# Patient Record
Sex: MALE | Race: WHITE | NOT HISPANIC OR LATINO | Employment: FULL TIME | URBAN - NONMETROPOLITAN AREA
[De-identification: names, ages, dates, MRNs, and addresses within clinical notes are randomized per-mention and may not be internally consistent; named-entity substitution may affect disease eponyms.]

---

## 2021-01-08 ENCOUNTER — HOSPITAL ENCOUNTER (EMERGENCY)
Facility: HOSPITAL | Age: 46
Discharge: HOME | End: 2021-01-08
Attending: EMERGENCY MEDICINE | Admitting: EMERGENCY MEDICINE
Payer: COMMERCIAL

## 2021-01-08 ENCOUNTER — APPOINTMENT (EMERGENCY)
Dept: RADIOLOGY | Facility: HOSPITAL | Age: 46
End: 2021-01-08
Payer: COMMERCIAL

## 2021-01-08 VITALS
DIASTOLIC BLOOD PRESSURE: 62 MMHG | OXYGEN SATURATION: 97 % | RESPIRATION RATE: 16 BRPM | TEMPERATURE: 96.9 F | HEART RATE: 79 BPM | WEIGHT: 165 LBS | SYSTOLIC BLOOD PRESSURE: 125 MMHG | BODY MASS INDEX: 26.63 KG/M2

## 2021-01-08 DIAGNOSIS — R07.9 CHEST PAIN: Primary | ICD-10-CM

## 2021-01-08 LAB
ALBUMIN SERPL BCP-MCNC: 4.1 G/DL (ref 3.5–5)
ALP SERPL-CCNC: 104 U/L (ref 46–116)
ALT SERPL W P-5'-P-CCNC: 44 U/L (ref 12–78)
ANION GAP SERPL CALCULATED.3IONS-SCNC: 8 MMOL/L (ref 4–13)
AST SERPL W P-5'-P-CCNC: 19 U/L (ref 5–45)
BASOPHILS # BLD AUTO: 0.02 THOUSANDS/ΜL (ref 0–0.1)
BASOPHILS NFR BLD AUTO: 0 % (ref 0–1)
BILIRUB SERPL-MCNC: 0.46 MG/DL (ref 0.2–1)
BUN SERPL-MCNC: 16 MG/DL (ref 5–25)
CALCIUM SERPL-MCNC: 8.9 MG/DL (ref 8.3–10.1)
CHLORIDE SERPL-SCNC: 107 MMOL/L (ref 100–108)
CO2 SERPL-SCNC: 30 MMOL/L (ref 21–32)
CREAT SERPL-MCNC: 0.97 MG/DL (ref 0.6–1.3)
EOSINOPHIL # BLD AUTO: 0.1 THOUSAND/ΜL (ref 0–0.61)
EOSINOPHIL NFR BLD AUTO: 1 % (ref 0–6)
ERYTHROCYTE [DISTWIDTH] IN BLOOD BY AUTOMATED COUNT: 12.7 % (ref 11.6–15.1)
GFR SERPL CREATININE-BSD FRML MDRD: 94 ML/MIN/1.73SQ M
GLUCOSE SERPL-MCNC: 92 MG/DL (ref 65–140)
HCT VFR BLD AUTO: 43.5 % (ref 36.5–49.3)
HGB BLD-MCNC: 14.6 G/DL (ref 12–17)
IMM GRANULOCYTES # BLD AUTO: 0.01 THOUSAND/UL (ref 0–0.2)
IMM GRANULOCYTES NFR BLD AUTO: 0 % (ref 0–2)
LYMPHOCYTES # BLD AUTO: 1.85 THOUSANDS/ΜL (ref 0.6–4.47)
LYMPHOCYTES NFR BLD AUTO: 27 % (ref 14–44)
MCH RBC QN AUTO: 27.2 PG (ref 26.8–34.3)
MCHC RBC AUTO-ENTMCNC: 33.6 G/DL (ref 31.4–37.4)
MCV RBC AUTO: 81 FL (ref 82–98)
MONOCYTES # BLD AUTO: 0.57 THOUSAND/ΜL (ref 0.17–1.22)
MONOCYTES NFR BLD AUTO: 8 % (ref 4–12)
NEUTROPHILS # BLD AUTO: 4.44 THOUSANDS/ΜL (ref 1.85–7.62)
NEUTS SEG NFR BLD AUTO: 64 % (ref 43–75)
NRBC BLD AUTO-RTO: 0 /100 WBCS
PLATELET # BLD AUTO: 244 THOUSANDS/UL (ref 149–390)
PMV BLD AUTO: 10.2 FL (ref 8.9–12.7)
POTASSIUM SERPL-SCNC: 3.8 MMOL/L (ref 3.5–5.3)
PROT SERPL-MCNC: 7.3 G/DL (ref 6.4–8.2)
RBC # BLD AUTO: 5.36 MILLION/UL (ref 3.88–5.62)
SODIUM SERPL-SCNC: 145 MMOL/L (ref 136–145)
TROPONIN I SERPL-MCNC: <0.02 NG/ML
WBC # BLD AUTO: 6.99 THOUSAND/UL (ref 4.31–10.16)

## 2021-01-08 PROCEDURE — 71045 X-RAY EXAM CHEST 1 VIEW: CPT

## 2021-01-08 PROCEDURE — 99285 EMERGENCY DEPT VISIT HI MDM: CPT | Performed by: EMERGENCY MEDICINE

## 2021-01-08 PROCEDURE — 84484 ASSAY OF TROPONIN QUANT: CPT | Performed by: PHYSICIAN ASSISTANT

## 2021-01-08 PROCEDURE — 85025 COMPLETE CBC W/AUTO DIFF WBC: CPT | Performed by: PHYSICIAN ASSISTANT

## 2021-01-08 PROCEDURE — 99285 EMERGENCY DEPT VISIT HI MDM: CPT

## 2021-01-08 PROCEDURE — 93005 ELECTROCARDIOGRAM TRACING: CPT

## 2021-01-08 PROCEDURE — 36415 COLL VENOUS BLD VENIPUNCTURE: CPT | Performed by: PHYSICIAN ASSISTANT

## 2021-01-08 PROCEDURE — 96374 THER/PROPH/DIAG INJ IV PUSH: CPT

## 2021-01-08 PROCEDURE — 80053 COMPREHEN METABOLIC PANEL: CPT | Performed by: PHYSICIAN ASSISTANT

## 2021-01-08 PROCEDURE — 96375 TX/PRO/DX INJ NEW DRUG ADDON: CPT

## 2021-01-08 RX ORDER — DIAZEPAM 5 MG/ML
2.5 INJECTION, SOLUTION INTRAMUSCULAR; INTRAVENOUS ONCE
Status: COMPLETED | OUTPATIENT
Start: 2021-01-08 | End: 2021-01-08

## 2021-01-08 RX ORDER — KETOROLAC TROMETHAMINE 30 MG/ML
30 INJECTION, SOLUTION INTRAMUSCULAR; INTRAVENOUS ONCE
Status: COMPLETED | OUTPATIENT
Start: 2021-01-08 | End: 2021-01-08

## 2021-01-08 RX ADMIN — DIAZEPAM 2.5 MG: 10 INJECTION, SOLUTION INTRAMUSCULAR; INTRAVENOUS at 20:43

## 2021-01-08 RX ADMIN — KETOROLAC TROMETHAMINE 30 MG: 30 INJECTION, SOLUTION INTRAMUSCULAR at 19:27

## 2021-01-09 NOTE — ED PROVIDER NOTES
History  Chief Complaint   Patient presents with    Chest Pain     pt states chest pain intermittent since roman  has had hx of same in the past  intermittent N, denies SOB     51-year-old male with past medical history chest pain, hyperlipidemia presents to ED with 2 weeks intermittent chest pain  Patient has had previous episodes in the past and is being worked up by Cardiology  Patient had a negative stress test at [facility] for previous symptoms  Chest Pain  Pain location:  Substernal area  Pain quality: aching    Pain radiates to:  Does not radiate  Pain radiates to the back: no    Pain severity:  Mild  Onset quality:  Sudden  Timing:  Intermittent  Progression:  Unchanged  Chronicity:  Recurrent  Context: not breathing, not at rest and no trauma    Relieved by:  None tried  Worsened by:  Nothing tried  Ineffective treatments:  None tried  Associated symptoms: no abdominal pain, no anxiety, no back pain, no cough, no fever, no orthopnea, no shortness of breath, not vomiting and no weakness    Risk factors: high cholesterol        Prior to Admission Medications   Prescriptions Last Dose Informant Patient Reported?  Taking?   atorvastatin (LIPITOR) 10 mg tablet  Self Yes No   Sig: Take 10 mg by mouth daily   fluticasone (FLONASE) 50 mcg/act nasal spray   No No   Sig: ADMINISTER 2 SPRAYS INTO NOSTRIL DAILY   omeprazole (PriLOSEC) 40 MG capsule  Self Yes No   Sig: Take 40 mg by mouth daily   ranitidine (ZANTAC) 300 MG capsule  Self Yes No   Sig: Take 300 mg by mouth every evening      Facility-Administered Medications: None       Past Medical History:   Diagnosis Date    Chronic sinusitis     Chronic throat clearing     DNS (deviated nasal septum)     Dysphagia     ETD (eustachian tube dysfunction)     GERD (gastroesophageal reflux disease)     Globus sensation     HL (hearing loss)     Hyperlipemia     Neck pain on left side     PND (post-nasal drip)     Rhinitis     Sleep difficulties     snoring       Past Surgical History:   Procedure Laterality Date    APPENDECTOMY         History reviewed  No pertinent family history  I have reviewed and agree with the history as documented  E-Cigarette/Vaping    E-Cigarette Use Never User      E-Cigarette/Vaping Substances     Social History     Tobacco Use    Smoking status: Former Smoker     Types: Cigarettes    Smokeless tobacco: Never Used   Substance Use Topics    Alcohol use: Not Currently    Drug use: Never       Review of Systems   Constitutional: Negative for fever  Respiratory: Negative for cough and shortness of breath  Cardiovascular: Positive for chest pain  Negative for orthopnea  Gastrointestinal: Negative for abdominal pain and vomiting  Musculoskeletal: Negative for back pain  Neurological: Negative for weakness  All other systems reviewed and are negative  Physical Exam  Physical Exam  Vitals signs and nursing note reviewed  Constitutional:       General: He is not in acute distress  Appearance: He is well-developed  HENT:      Head: Normocephalic and atraumatic  Right Ear: External ear normal       Left Ear: External ear normal    Eyes:      Conjunctiva/sclera: Conjunctivae normal       Pupils: Pupils are equal, round, and reactive to light  Neck:      Musculoskeletal: Normal range of motion and neck supple  Cardiovascular:      Rate and Rhythm: Normal rate and regular rhythm  Heart sounds: Normal heart sounds  No murmur  Pulmonary:      Effort: Pulmonary effort is normal       Breath sounds: Normal breath sounds  No wheezing or rales  Abdominal:      General: Bowel sounds are normal  There is no distension  Palpations: Abdomen is soft  Tenderness: There is no abdominal tenderness  There is no guarding or rebound  Musculoskeletal: Normal range of motion  General: No deformity  Skin:     General: Skin is warm and dry  Findings: No rash  Neurological:      General: No focal deficit present  Mental Status: He is alert and oriented to person, place, and time  Cranial Nerves: No cranial nerve deficit     Psychiatric:         Behavior: Behavior normal          Vital Signs  ED Triage Vitals [01/08/21 1757]   Temperature Pulse Respirations Blood Pressure SpO2   98 1 °F (36 7 °C) 96 20 143/91 98 %      Temp Source Heart Rate Source Patient Position - Orthostatic VS BP Location FiO2 (%)   Temporal Monitor Sitting Right arm --      Pain Score       5           Vitals:    01/08/21 1757 01/08/21 2005 01/08/21 2100   BP: 143/91 127/59 125/62   Pulse: 96 73 79   Patient Position - Orthostatic VS: Sitting           Visual Acuity      ED Medications  Medications   ketorolac (TORADOL) injection 30 mg (30 mg Intravenous Given 1/8/21 1927)   diazepam (VALIUM) injection 2 5 mg (2 5 mg Intravenous Given 1/8/21 2043)       Diagnostic Studies  Results Reviewed     Procedure Component Value Units Date/Time    Troponin I [[id number]]  (Normal) Collected: 01/08/21 1848    Lab Status: Final result Specimen: Blood from Arm, Left Updated: 01/08/21 1914     Troponin I <0 02 ng/mL     Comprehensive metabolic panel [[id number]] Collected: 01/08/21 1848    Lab Status: Final result Specimen: Blood from Arm, Left Updated: 01/08/21 1910     Sodium 145 mmol/L      Potassium 3 8 mmol/L      Chloride 107 mmol/L      CO2 30 mmol/L      ANION GAP 8 mmol/L      BUN 16 mg/dL      Creatinine 0 97 mg/dL      Glucose 92 mg/dL      Calcium 8 9 mg/dL      AST 19 U/L      ALT 44 U/L      Alkaline Phosphatase 104 U/L      Total Protein 7 3 g/dL      Albumin 4 1 g/dL      Total Bilirubin 0 46 mg/dL      eGFR 94 ml/min/1 73sq m     Narrative:      Ralph guidelines for Chronic Kidney Disease (CKD):     Stage 1 with normal or high GFR (GFR > 90 mL/min/1 73 square meters)    Stage 2 Mild CKD (GFR = 60-89 mL/min/1 73 square meters)    Stage 3A Moderate CKD (GFR = 45-59 mL/min/1 73 square meters)    Stage 3B Moderate CKD (GFR = 30-44 mL/min/1 73 square meters)    Stage 4 Severe CKD (GFR = 15-29 mL/min/1 73 square meters)    Stage 5 End Stage CKD (GFR <15 mL/min/1 73 square meters)  Note: GFR calculation is accurate only with a steady state creatinine    CBC and differential [[id number]]  (Abnormal) Collected: 01/08/21 1848    Lab Status: Final result Specimen: Blood from Arm, Left Updated: 01/08/21 1853     WBC 6 99 Thousand/uL      RBC 5 36 Million/uL      Hemoglobin 14 6 g/dL      Hematocrit 43 5 %      MCV 81 fL      MCH 27 2 pg      MCHC 33 6 g/dL      RDW 12 7 %      MPV 10 2 fL      Platelets 382 Thousands/uL      nRBC 0 /100 WBCs      Neutrophils Relative 64 %      Immat GRANS % 0 %      Lymphocytes Relative 27 %      Monocytes Relative 8 %      Eosinophils Relative 1 %      Basophils Relative 0 %      Neutrophils Absolute 4 44 Thousands/µL      Immature Grans Absolute 0 01 Thousand/uL      Lymphocytes Absolute 1 85 Thousands/µL      Monocytes Absolute 0 57 Thousand/µL      Eosinophils Absolute 0 10 Thousand/µL      Basophils Absolute 0 02 Thousands/µL                  XR chest 1 view   ED Interpretation by Ghulam Wiley DO (01/08 2016)   No pneumonia      Final Result by Karla Srinivasan MD (01/09 0123)      No acute cardiopulmonary disease  Workstation performed: [id number]                    Procedures  Procedures         ED Course  ED Course as of Keenan 10 1932   Siminkeiko Rocha Jan 08, 2021 1904 Echo/stress test from 2014 reviewed  1906 EKG:  Normal sinus rhythm at 100 beats per minute, normal NE, normal QRS, normal QTC, no acute ST elevation noted on EKG  2101 Patient feeling better  Patient is afebrile, vitals stable, nontoxic appearing  counseled patient extensively signs & symptoms to return for and follow-up family doctor    Patient has a follow-up appointment with his cardiologist on the 19th                HEART Risk Score      Most Recent Value   Heart Score Risk Calculator   History  0 Filed at: 01/08/2021 1959   ECG  0 Filed at: 01/08/2021 1959   Age  0 Filed at: 01/08/2021 1959   Risk Factors  1 Filed at: 01/08/2021 1959   Troponin  0 Filed at: 01/08/2021 1959   HEART Score  1 Filed at: 01/08/2021 1959                      SBIRT 20yo+      Most Recent Value   SBIRT (25 yo +)   In order to provide better care to our patients, we are screening all of our patients for alcohol and drug use  Would it be okay to ask you these screening questions? Yes Filed at: 01/08/2021 1851   Initial Alcohol Screen: US AUDIT-C    1  How often do you have a drink containing alcohol?  0 Filed at: 01/08/2021 1851   2  How many drinks containing alcohol do you have on a typical day you are drinking? 0 Filed at: 01/08/2021 1851   3a  Male UNDER 65: How often do you have five or more drinks on one occasion? 0 Filed at: 01/08/2021 1851   3b  FEMALE Any Age, or MALE 65+: How often do you have 4 or more drinks on one occassion? 0 Filed at: 01/08/2021 1851   Audit-C Score  0 Filed at: 01/08/2021 1851   SUNNI: How many times in the past year have you    Used an illegal drug or used a prescription medication for non-medical reasons? Never Filed at: 01/08/2021 1851                    MDM    Disposition  Final diagnoses:   Chest pain     Time reflects when diagnosis was documented in both MDM as applicable and the Disposition within this note     Time User Action Codes Description Comment    1/8/2021  7:01 PM Viral Jarrell Add [R07 9] Chest pain       ED Disposition     ED Disposition Condition Date/Time Comment    Discharge Stable Fri Jan 8, 2021  9:04 PM Francesco Marcano discharge to home/self care              Follow-up Information     Follow up With Specialties Details Why DO [facility] Call in 1 day  [address] [facility],[address] [place] [address]      Pat Alcala MD Family Medicine Call in 1 day  [facility] [address] [place] [address]  [phone]            Discharge Medication List as of 1/8/2021  9:05 PM      CONTINUE these medications which have NOT CHANGED    Details   atorvastatin (LIPITOR) 10 mg tablet Take 10 mg by mouth daily, Historical Med      fluticasone (FLONASE) 50 mcg/act nasal spray ADMINISTER 2 SPRAYS INTO NOSTRIL DAILY, Normal      omeprazole (PriLOSEC) 40 MG capsule Take 40 mg by mouth daily, Historical Med      ranitidine (ZANTAC) 300 MG capsule Take 300 mg by mouth every evening, Historical Med           No discharge procedures on file      PDMP Review     None          ED Provider  Electronically Signed by           Demond Parsons DO  01/10/21 1932

## 2021-01-11 LAB
ATRIAL RATE: 100 BPM
P AXIS: 52 DEGREES
PR INTERVAL: 116 MS
QRS AXIS: 32 DEGREES
QRSD INTERVAL: 70 MS
QT INTERVAL: 336 MS
QTC INTERVAL: 433 MS
T WAVE AXIS: 41 DEGREES
VENTRICULAR RATE: 100 BPM

## 2021-04-13 DIAGNOSIS — Z23 ENCOUNTER FOR IMMUNIZATION: ICD-10-CM

## 2021-08-04 NOTE — PROGRESS NOTES
Assessment and plan:     Prostate cancer screening  ·  PSA 2 weeks, will call with results   ·  normal rectal exam  ·  if PSA is normal recommend no further screening until the age of 54    Incomplete bladder emptying  ·  Intermittent sensation of incomplete bladder emptying  ·  inconsistent urinary stream that is random in occurrence  ·  avoid bladder irritants  ·  increase hydration with water, at least 64 oz  ·  follow-up p r n  if symptoms do not improve         RICHARD De Guzman    History of Present Illness     Lali Norris is a 39 y o  New patient presenting for change in urination and prostate cancer screening  family history of prostate cancer:denies  Denies STI hx or urological procedures  denies recurrent urinary tract infections  Denies kidney stone history  Hx of appendectomy  denies back issues or surgeries, denies incontinence  Drinks iced tea-1 per day, gator chad and only 16 ounces of water  Drinks one cup of coffee every morning  Reviewed normal bladder function and prostate cancer Screening/risks  AUA SYMPTOM SCORE      Most Recent Value   AUA SYMPTOM SCORE   How often have you had a sensation of not emptying your bladder completely after you finished urinating? 1   How often have you had to urinate again less than two hours after you finished urinating? 0   How often have you found you stopped and started again several times when you urinate? 2   How often have you found it difficult to postpone urination? 0   How often have you had a weak urinary stream?  1   How often have you had to push or strain to begin urination?   1   How many times did you most typically get up to urinate from the time you went to bed at night until the time you got up in the morning?  0   Quality of Life: If you were to spend the rest of your life with your urinary condition just the way it is now, how would you feel about that?  0   AUA SYMPTOM SCORE  5          Laboratory     Lab Results   Component Value Date    BUN 16 01/08/2021    CREATININE 0 97 01/08/2021       No components found for: GFR    Lab Results   Component Value Date    CALCIUM 8 9 01/08/2021    K 3 8 01/08/2021    CO2 30 01/08/2021     01/08/2021       Lab Results   Component Value Date    WBC 6 99 01/08/2021    HGB 14 6 01/08/2021    HCT 43 5 01/08/2021    MCV 81 (L) 01/08/2021     01/08/2021       No results found for: PSA    No results found for this or any previous visit (from the past 1 hour(s))  @RESULT(URINEMICROSCOPIC)@    @RESULT(URINECULTURE)@    Radiology       Review of Systems     Review of Systems   Constitutional: Negative for activity change, appetite change, chills, fatigue, fever and unexpected weight change  HENT: Negative for facial swelling  Eyes: Negative for discharge  Respiratory: Negative  Negative for cough and shortness of breath  Cardiovascular: Negative for chest pain and leg swelling  Gastrointestinal: Negative  Negative for abdominal distention, abdominal pain, constipation, diarrhea, nausea and vomiting  Endocrine: Negative  Genitourinary: Negative  Negative for decreased urine volume, difficulty urinating, discharge, dysuria, enuresis, flank pain, frequency, genital sores, hematuria, penile pain, penile swelling, scrotal swelling, testicular pain and urgency  Occasional sensation of incomplete bladder emptying 2-3 times a month, reports inconsistent stream that is random  Musculoskeletal: Negative for back pain and myalgias  Skin: Negative for pallor and rash  Allergic/Immunologic: Negative  Negative for immunocompromised state  Neurological: Negative for facial asymmetry and speech difficulty  Psychiatric/Behavioral: Negative for agitation and confusion  Allergies     No Known Allergies    Physical Exam     Physical Exam  Vitals reviewed  Constitutional:       General: He is not in acute distress  Appearance: Normal appearance  He is normal weight   He is not ill-appearing, toxic-appearing or diaphoretic  HENT:      Head: Normocephalic and atraumatic  Eyes:      General: No scleral icterus  Cardiovascular:      Rate and Rhythm: Normal rate  Pulmonary:      Effort: Pulmonary effort is normal  No respiratory distress  Abdominal:      General: Abdomen is flat  There is no distension  Palpations: Abdomen is soft  Tenderness: There is no abdominal tenderness  There is no right CVA tenderness, left CVA tenderness, guarding or rebound  Genitourinary:     Penis: Circumcised  No hypospadias, erythema, tenderness, discharge, swelling or lesions  Testes:         Right: Mass, tenderness or swelling not present  Right testis is descended  Left: Mass, tenderness or swelling not present  Left testis is descended  Epididymis:      Right: Not inflamed  No tenderness  Left: Not inflamed  No tenderness  Comments: Prostate smooth nontender possibly 30 g without any nodules or indurations  Musculoskeletal:         General: No swelling  Cervical back: Normal range of motion  Right lower leg: No edema  Left lower leg: No edema  Lymphadenopathy:      Lower Body: No right inguinal adenopathy  No left inguinal adenopathy  Skin:     General: Skin is warm and dry  Coloration: Skin is not jaundiced or pale  Findings: No rash  Neurological:      General: No focal deficit present  Mental Status: He is alert and oriented to person, place, and time  Gait: Gait normal    Psychiatric:         Mood and Affect: Mood normal          Behavior: Behavior normal          Thought Content:  Thought content normal          Judgment: Judgment normal          Vital Signs     Vitals:    08/05/21 1519   BP: 126/88   Pulse: 70   Weight: 77 6 kg (171 lb 1 2 oz)       Current Medications       Current Outpatient Medications:     atorvastatin (LIPITOR) 10 mg tablet, Take 10 mg by mouth daily, Disp: , Rfl:     fluticasone (FLONASE) 50 mcg/act nasal spray, ADMINISTER 2 SPRAYS INTO NOSTRIL DAILY, Disp: 48 mL, Rfl: 4    omeprazole (PriLOSEC) 40 MG capsule, Take 40 mg by mouth daily, Disp: , Rfl:     ranitidine (ZANTAC) 300 MG capsule, Take 300 mg by mouth every evening (Patient not taking: Reported on 8/5/2021), Disp: , Rfl:     Active Problems     Patient Active Problem List   Diagnosis    Prostate cancer screening    Incomplete bladder emptying       Past Medical History     Past Medical History:   Diagnosis Date    Chronic sinusitis     Chronic throat clearing     DNS (deviated nasal septum)     Dysphagia     ETD (eustachian tube dysfunction)     GERD (gastroesophageal reflux disease)     Globus sensation     HL (hearing loss)     Hyperlipemia     Neck pain on left side     PND (post-nasal drip)     Rhinitis     Sleep difficulties     snoring       Surgical History     Past Surgical History:   Procedure Laterality Date    APPENDECTOMY         Family History     History reviewed  No pertinent family history  Social History     Social History     Social History     Tobacco Use   Smoking Status Former Smoker    Types: Cigarettes   Smokeless Tobacco Never Used       Past Surgical History:   Procedure Laterality Date    APPENDECTOMY           The following portions of the patient's history were reviewed and updated as appropriate: allergies, current medications, past family history, past medical history, past social history, past surgical history and problem list    Please note :  Voice dictation software has been used to create this document  There may be inadvertent transcription errors      [address] [address]

## 2021-08-05 ENCOUNTER — OFFICE VISIT (OUTPATIENT)
Dept: UROLOGY | Facility: CLINIC | Age: 46
End: 2021-08-05
Payer: COMMERCIAL

## 2021-08-05 VITALS
HEART RATE: 70 BPM | DIASTOLIC BLOOD PRESSURE: 88 MMHG | BODY MASS INDEX: 27.61 KG/M2 | WEIGHT: 171.08 LBS | SYSTOLIC BLOOD PRESSURE: 126 MMHG

## 2021-08-05 DIAGNOSIS — R33.9 INCOMPLETE BLADDER EMPTYING: Primary | ICD-10-CM

## 2021-08-05 DIAGNOSIS — Z12.5 PROSTATE CANCER SCREENING: ICD-10-CM

## 2021-08-05 PROBLEM — R39.198 ABNORMAL URINATION: Status: RESOLVED | Noted: 2021-08-05 | Resolved: 2021-08-05

## 2021-08-05 PROBLEM — R39.198 ABNORMAL URINATION: Status: ACTIVE | Noted: 2021-08-05

## 2021-08-05 PROCEDURE — 99203 OFFICE O/P NEW LOW 30 MIN: CPT | Performed by: NURSE PRACTITIONER

## 2021-08-05 NOTE — ASSESSMENT & PLAN NOTE
·  Intermittent sensation of incomplete bladder emptying  ·  inconsistent urinary stream that is random in occurrence  ·  avoid bladder irritants  ·  increase hydration with water, at least 64 oz  ·  follow-up p r n  if symptoms do not improve

## 2021-08-05 NOTE — ASSESSMENT & PLAN NOTE
·  PSA 2 weeks, will call with results   ·  normal rectal exam  ·  if PSA is normal recommend no further screening until the age of 54

## 2021-08-05 NOTE — PATIENT INSTRUCTIONS
BLADDER HEALTH    WHAT IS CONSIDERED NORMAL?  The average bladder can hold about 2 cups of urine before it needs to be emptied   The normal range of voiding urine is 6 to 8 times during a 24 hour period  As we get older, our bladder capacity can get smaller and we may need to pass urine more frequently but usually not more than every 2 hours   Urine should flow easily without discomfort in a good, steady stream until the bladder is empty  No pushing or straining is necessary to empty the bladder   An urge is a signal that you feel as the bladder stretches to fill with urine  Urges can be felt even if the bladder is not full  Urges are not commands to go to the toilet, merely a signal and can be controlled  WHAT ARE GOOD BLADDER HABITS?  Take your time when emptying your bladder  Dont strain or push to empty your bladder  Make sure you empty your bladder completely each time you pass urine  Do not rush the process   Consistently ignoring the urge to go (waiting more than 4 hours between toileting) or urinating too infrequently may be convenient but not healthy for your bladder   Avoid going to the toilet just in case or more often than every 2 hours  It is usually not necessary to go when you feel the first urge  Try to go only when your bladder is full  Urgency and frequency of urination can be improved by retraining the bladder and spacing your fluid intake throughout the day  Practice good toilet habits  Dont let your bladder control your life  TIPS TO MAINTAIN GOOD BLADDER HABITS   Maintain a good fluid intake  Depending on your body size and environment, drink 6 -8 cups (8 oz each) of fluid per day unless otherwise advised by your doctor  Not enough fluid creates a foul odor and dark color of the urine     Limit the amount of caffeine (coffee, cola, chocolate or tea) and citrus foods that you consume as these foods can be associated with increased sensation of urinary urgency and frequency   Limit the amount of alcohol you drink  Alcohol increases urine production and makes it difficult for the brain to coordinate bladder control   Avoid constipation by maintaining a balanced diet of dietary fiber   Cigarette smoking is also irritating to the bladder surface and is associated with bladder cancer  In addition, the coughing associated with smoking may lead to increased incontinent episodes because of the increased pressure  HOW DIET CAN AFFECT YOUR BLADDER  Although there is no particular "diet" that can cure bladder control, there are certain dietary suggestions you can use to help control the problem  There are 2 points to consider when evaluating how your habits and diet may affect your bladder:    1  Foods and fluids can irritate the bladder  Some foods and beverages are thought to contribute to bladder leakage and irritability  However their effect on the bladder is not completely understood and you may want to see if eliminating one or all of these items improves your bladder control  If you are unable to give them up completely, it is recommended that you use the following items in moderation:   Acidic beverages and foods (orange juice, grapefruit juice, lemonade etc)   Alcoholic beverages   Vinegar   Coffee (regular and decaf)   Tea (regular and decaf)   Caffeinated beverages   Carbonated beverages          2  Drinking enough and the right kinds of fluids  Many people with bladder control issues decrease their intake of liquids in hope that they will need to urinate less frequently or have less urinary leakage   You should not restrict fluids to control your bladder  While a decrease in liquid intake does result in a decrease in the volume of urine, the smaller amount of urine may be more highly concentrated         Highly concentrated, dark yellow urine is irritating to the bladder surface and may actually cause you to go to the bathroom more frequently   It also encourages the growth of bacteria, which may lead to infections resulting in incontinence   Substitutions for Bladder Irritants: water is always the best beverage choice  Grape and apple juice are thirst quenchers are good selections and are not as irritating to the bladder   o Low acid fruits:  Pears, apricots, papaya, watermelon  o For coffee drinkers: KAVA®, Postum®, Yaron®, Kaffree [place]®  o For tea drinkers:  non-citrus or herbal and sun brewed tea  o   Prostate Biopsy   AMBULATORY CARE:   What you need to know about a prostate biopsy:  A prostate biopsy is a procedure to remove samples of tissue from your prostate gland  The prostate is a male sex gland that makes fluid found in semen  It is located just below the bladder  After the samples are removed, they are sent to a lab and tested for cancer  How to prepare for a prostate biopsy:   · Your healthcare provider will talk to you about how to prepare for this procedure  You will need to stop taking blood thinners several days before your procedure  Examples of blood thinners include warfarin and NSAIDs  You may also need to stop taking herbal supplements before your procedure  Your provider may tell you to shower the night before your surgery  He or she may tell you to use a certain soap to help prevent a surgical site infection  Your provider may tell you not to eat or drink anything after midnight on the day of your surgery  He or she will tell you what other medicines to take or not take on the day of your procedure  · You will be given an antibiotic to help prevent a bacterial infection  You may need to give yourself an enema (liquid medicine put in your rectum) to help empty your bowel before your procedure      What will happen during a prostate biopsy:   · You may need to lie on your side with your knees pulled up toward your chest  Numbing cream or gel may be put into your rectum, or numbing medicine may be injected near your prostate  You may instead be given general anesthesia to keep you asleep and free from pain during the procedure  A biopsy sample may be taken through your rectum, urethra, or perineum  The perineum is the area between your scrotum and rectum  Most of the time, biopsy samples are taken through the rectum  · If your healthcare provider takes a sample through your rectum, he will insert a small ultrasound probe into your rectum  The ultrasound shows pictures of your prostate on a monitor, and is used to help guide the biopsy needle  Your healthcare provider will push the biopsy needle through the wall of your rectum and into your prostate gland  Your healthcare provider will remove between 6 to 12 samples of tissue from different areas of your prostate gland  The samples will be sent to a lab and tested for cancer  What will happen after a prostate biopsy: You may feel soreness at the biopsy site  You may need to take antibiotics for up to 2 days after your procedure to help prevent an infection  You may have some bleeding from your rectum  You may also have blood in your urine, bowel movements, or semen  Risks of a prostate biopsy: You may bleed more than expected or get an infection in your urinary tract or prostate gland  The infection may spread to your blood and the rest of your body  Your bladder may not empty completely when you urinate  You may need a catheter to help empty your bladder for a short period of time  Cancer cells may be missed during your biopsy procedure  You may need another prostate biopsy to check for cancer again  Seek care immediately if:   · You have heavy bleeding from your rectum  · You urinate very little or not at all  · You have pain from your procedure that gets worse, even after you take pain medicine  Contact your healthcare provider if:   · You have a fever or chills  · You feel pain or burning when you urinate      · Your urine is cloudy or smells bad  · You have questions or concerns about your condition or care  Medicines:  · Medicines  can help decrease pain  You may need medicine to prevent or treat a bacterial infection  Ask how to take pain medicine safely  · Take your medicine as directed  Contact your healthcare provider if you think your medicine is not helping or if you have side effects  Tell him or her if you are allergic to any medicine  Keep a list of the medicines, vitamins, and herbs you take  Include the amounts, and when and why you take them  Bring the list or the pill bottles to follow-up visits  Carry your medicine list with you in case of an emergency  Follow up with your healthcare provider or urologist as directed: You may need to return for more tests or procedures  Write down your questions so you remember to ask them during your visits  © Copyright [facility] 2021 Information is for End User's use only and may not be sold, redistributed or otherwise used for commercial purposes  All illustrations and images included in CareNotes® are the copyrighted property of A D A M , Inc  or [facility] Raymond Ruvalcaba   The above information is an  only  It is not intended as medical advice for individual conditions or treatments  Talk to your doctor, nurse or pharmacist before following any medical regimen to see if it is safe and effective for you

## 2021-12-21 ENCOUNTER — HOSPITAL ENCOUNTER (EMERGENCY)
Facility: HOSPITAL | Age: 46
Discharge: HOME | End: 2021-12-21
Attending: STUDENT IN AN ORGANIZED HEALTH CARE EDUCATION/TRAINING PROGRAM | Admitting: STUDENT IN AN ORGANIZED HEALTH CARE EDUCATION/TRAINING PROGRAM
Payer: COMMERCIAL

## 2021-12-21 VITALS
HEART RATE: 109 BPM | SYSTOLIC BLOOD PRESSURE: 151 MMHG | TEMPERATURE: 97.5 F | OXYGEN SATURATION: 98 % | BODY MASS INDEX: 27.32 KG/M2 | WEIGHT: 170 LBS | HEIGHT: 66 IN | RESPIRATION RATE: 20 BRPM | DIASTOLIC BLOOD PRESSURE: 100 MMHG

## 2021-12-21 DIAGNOSIS — R22.42 LOCALIZED SWELLING OF LEFT LOWER LEG: ICD-10-CM

## 2021-12-21 DIAGNOSIS — M79.662 PAIN IN LEFT LOWER LEG: Primary | ICD-10-CM

## 2021-12-21 PROCEDURE — 99283 EMERGENCY DEPT VISIT LOW MDM: CPT

## 2021-12-21 PROCEDURE — 99284 EMERGENCY DEPT VISIT MOD MDM: CPT | Performed by: STUDENT IN AN ORGANIZED HEALTH CARE EDUCATION/TRAINING PROGRAM

## 2021-12-21 PROCEDURE — 96372 THER/PROPH/DIAG INJ SC/IM: CPT

## 2021-12-21 RX ORDER — KETOROLAC TROMETHAMINE 30 MG/ML
30 INJECTION, SOLUTION INTRAMUSCULAR; INTRAVENOUS ONCE
Status: COMPLETED | OUTPATIENT
Start: 2021-12-21 | End: 2021-12-21

## 2021-12-21 RX ADMIN — KETOROLAC TROMETHAMINE 30 MG: 30 INJECTION, SOLUTION INTRAMUSCULAR at 20:59

## 2022-02-01 DIAGNOSIS — K21.9 GASTROESOPHAGEAL REFLUX DISEASE WITHOUT ESOPHAGITIS: Primary | ICD-10-CM

## 2022-02-01 NOTE — TELEPHONE ENCOUNTER
Per [facility] chart pt was last seen   He left a message requesting a refill of Omeprazole  A request sent to the scheduling dept to schedule pt for an office visit

## 2022-02-01 NOTE — TELEPHONE ENCOUNTER
Called and lmom for pt to call back to schedule a f/u appt to meds  I will try calling again in 1 week  If he calls back please get him scheduled  Thank you

## 2022-02-01 NOTE — TELEPHONE ENCOUNTER
Patient made a virtual appointment with Dr Colleen Green because he lives over an hour away  Please send in medication  Thank you!

## 2022-02-04 RX ORDER — OMEPRAZOLE 40 MG/1
40 CAPSULE, DELAYED RELEASE ORAL DAILY
Qty: 30 CAPSULE | Refills: 0 | Status: SHIPPED | OUTPATIENT
Start: 2022-02-04 | End: 2022-02-28

## 2022-02-22 ENCOUNTER — TELEMEDICINE (OUTPATIENT)
Dept: GASTROENTEROLOGY | Facility: CLINIC | Age: 47
End: 2022-02-22
Payer: COMMERCIAL

## 2022-02-22 VITALS — BODY MASS INDEX: 28.12 KG/M2 | HEIGHT: 66 IN | WEIGHT: 175 LBS

## 2022-02-22 DIAGNOSIS — Z12.11 COLON CANCER SCREENING: ICD-10-CM

## 2022-02-22 DIAGNOSIS — K21.9 GASTROESOPHAGEAL REFLUX DISEASE WITHOUT ESOPHAGITIS: Primary | ICD-10-CM

## 2022-02-22 PROCEDURE — 99213 OFFICE O/P EST LOW 20 MIN: CPT | Performed by: INTERNAL MEDICINE

## 2022-02-22 NOTE — PROGRESS NOTES
Virtual Regular Visit    Verification of patient location:    Patient is located in the following state in which I hold an active license [place]      Assessment/Plan:    Problem List Items Addressed This Visit        Other    Colon cancer screening      Other Visit Diagnoses     Gastroesophageal reflux disease without esophagitis    -  Primary      We will schedule him for EGD and colonoscopy for July of this year  He will stop his Prilosec he will use Pepcid Complete as needed use Prilosec prophylactically on demand  He will otherwise follow-up in a year         Reason for visit is   Chief Complaint   Patient presents with    Follow-up     Follow up to meds - patient is wanting to wean himself off of Omeprazole, patient has been taking it once a week    Virtual Regular Visit        Encounter provider Danielle Freitas MD    Provider located at [address]      Recent Visits  No visits were found meeting these conditions  Showing recent visits within past 7 days and meeting all other requirements  Today's Visits  Date Type Provider Dept   02/22/22 Telemedicine Danielle Freitas MD [address]   Showing today's visits and meeting all other requirements  Future Appointments  No visits were found meeting these conditions  Showing future appointments within next 150 days and meeting all other requirements       The patient was identified by name and date of birth  Deepthi Sarkar was informed that this is a telemedicine visit and that the visit is being conducted through [address] [place] [address] Now and patient was informed that this is a secure, HIPAA-compliant platform  He agrees to proceed     My office door was closed  No one else was in the room  He acknowledged consent and understanding of privacy and security of the video platform   The patient has agreed to participate and understands they can discontinue the visit at any time  Patient is aware this is a billable service  Subjective  Lis Gonzáles is a 55 y o  male with a history gastroesophageal reflux  Also distant history of Peñaloza's esophagus  He is markedly improved he is only taking his Prilosec once a week  We discussed stopping this and using it on demand as well as Pepcid Complete  Additionally he is 55 suggested screening colonoscopy  He did have a colonoscopy for diagnostic reason some 8 years ago and it was normal    HPI     Past Medical History:   Diagnosis Date    Chronic sinusitis     Chronic throat clearing     DNS (deviated nasal septum)     Dysphagia     ETD (eustachian tube dysfunction)     GERD (gastroesophageal reflux disease)     Globus sensation     HL (hearing loss)     Hyperlipemia     Neck pain on left side     PND (post-nasal drip)     Rhinitis     Sleep difficulties     snoring       Past Surgical History:   Procedure Laterality Date    APPENDECTOMY         Current Outpatient Medications   Medication Sig Dispense Refill    atorvastatin (LIPITOR) 10 mg tablet Take 10 mg by mouth daily      fluticasone (FLONASE) 50 mcg/act nasal spray ADMINISTER 2 SPRAYS INTO NOSTRIL DAILY 48 mL 4    omeprazole (PriLOSEC) 40 MG capsule Take 1 capsule (40 mg total) by mouth daily 30 capsule 0     No current facility-administered medications for this visit  No Known Allergies    Review of Systems otherwise unremarkable    Video Exam    Vitals:    02/22/22 1601   Weight: 79 4 kg (175 lb)   Height: 5' 6" (1 676 m)       Physical Exam   Makes good eye contact HEENT unremarkable neck is supple abdomen appears benign no obvious rashes or neural deficits  I spent 9:17 minutes directly with the patient during this visit    VIRTUAL VISIT [address] verbally agrees to participate in [employer]   Pt is aware that [employer] could be limited without vital signs or the ability to perform a full hands-on physical exam  Prince Trevino understands he or the provider may request at any time to terminate the video visit and request the patient to seek care or treatment in person

## 2022-02-27 DIAGNOSIS — K21.9 GASTROESOPHAGEAL REFLUX DISEASE WITHOUT ESOPHAGITIS: ICD-10-CM

## 2022-02-28 RX ORDER — OMEPRAZOLE 40 MG/1
40 CAPSULE, DELAYED RELEASE ORAL DAILY
Qty: 30 CAPSULE | Refills: 0 | Status: SHIPPED | OUTPATIENT
Start: 2022-02-28

## 2022-03-24 ENCOUNTER — HOSPITAL ENCOUNTER (OUTPATIENT)
Dept: CT IMAGING | Facility: HOSPITAL | Age: 47
Discharge: HOME | End: 2022-03-24
Attending: OTOLARYNGOLOGY
Payer: COMMERCIAL

## 2022-03-24 DIAGNOSIS — M26.609 TMJ (TEMPOROMANDIBULAR JOINT DISORDER): ICD-10-CM

## 2022-03-24 DIAGNOSIS — J34.89 SINUS PRESSURE: ICD-10-CM

## 2022-03-24 DIAGNOSIS — J34.89 FRONTAL SINUS PAIN: ICD-10-CM

## 2022-03-24 PROCEDURE — 70486 CT MAXILLOFACIAL W/O DYE: CPT

## 2022-03-24 PROCEDURE — G1004 CDSM NDSC: HCPCS

## 2022-05-18 ENCOUNTER — TELEPHONE (OUTPATIENT)
Dept: GASTROENTEROLOGY | Facility: CLINIC | Age: 47
End: 2022-05-18

## 2022-05-18 NOTE — TELEPHONE ENCOUNTER
Patients GI provider:  Dr Pinon [facility]    Number to return call: [phone]    Reason for call: Pt calling to schedule procedures      Scheduled procedure/appointment date if applicable:N/A

## 2022-05-19 ENCOUNTER — TELEPHONE (OUTPATIENT)
Dept: GASTROENTEROLOGY | Facility: CLINIC | Age: 47
End: 2022-05-19

## 2022-05-19 NOTE — TELEPHONE ENCOUNTER
[facility] Assessment    Name: Ashleigh Turner  YOB: 1975  Last Height: 5' 6" (1 676 m)  Last weight: 79 4 kg (175 lb)  BMI: 28 25 kg/m²  Procedure: EGD/Colon  Diagnosis: see orders  Date of procedure: 8/5/22  Prep: Miralax w/ dul   Responsible : yes  Phone#:   Name completing form: Margot Anaya  Date form completed: 05/19/22      If the patient answers yes to any of these questions, schedule in a hospital  Are you pregnant: No  Do you rely on a wheelchair for mobility: No  Have you been diagnosed with End Stage Renal Disease (ESRD): No  Do you need oxygen during the day: No  Have you had a heart attack or stroke within the past three months: No  Have you had a seizure within the past three months: No  Have you ever been informed by anesthesia that you have a difficult airway: No  Additional Questions  Have you had any cardiac testing or are under the care of a Cardiologist (see cardiac list): No  Cardiac list:   Do you have an implanted cardiac defibrillator: No (Comment:  This patient should be scheduled in the hospital)    Have any bleeding problems, such as anemia or hemophilia (If patient has H&H result below 8, schedule in hospital   H&H must be within 30 days of procedure): No    Had an organ transplant within the past 3 months: No    Do you have any present infections: No  Do you get short of breath when walking a few blocks: No  Have you been diagnosed with diabetes: No  Comments (provide cardiac provider information if applicable):

## 2022-05-19 NOTE — TELEPHONE ENCOUNTER
Scheduled date of EGD/colonoscopy (as of today): 8/5/22  Physician performing EGD/colonoscopy: Dr Pinon Neighbor   Location of EGD/colonoscopy: [facility]  Desired bowel prep reviewed with patient:miralax w/ dul   Instructions reviewed with patient by:jose armando  Clearances:  n/a

## 2022-05-23 PROBLEM — K62.5 RECTAL BLEEDING: Status: ACTIVE | Noted: 2022-05-23

## 2022-07-27 ENCOUNTER — TELEPHONE (OUTPATIENT)
Dept: OTHER | Facility: OTHER | Age: 47
End: 2022-07-27

## 2022-07-27 NOTE — TELEPHONE ENCOUNTER
Call from patient requesting call back  Patient stating his EGD was denied 2 months ago however he received approval letter in the mail and would like to discuss

## 2022-07-28 ENCOUNTER — TELEPHONE (OUTPATIENT)
Dept: GASTROENTEROLOGY | Facility: CLINIC | Age: 47
End: 2022-07-28

## 2022-07-28 NOTE — TELEPHONE ENCOUNTER
I lmom confirming pt's colonoscopy/EGD scheduled on 8/5/22 with Dr Yudith Lopez at [facility]  Informed pt that [facility] will be calling 1-2 days prior with arrival time  Informed of clear liquid diet the day of the procedure as well as the bowel cleansing preparation  Informed pt will need a  the day of the procedure due to being under sedation  I asked pt to please call back if has not received instructions or if has any questions  Advised pt to contact insurance if has any questions regarding coverage of the procedure

## 2022-07-29 NOTE — TELEPHONE ENCOUNTER
Do not know how to send through my chart  Called and spoke to pt whom informed ok to email  Instructions emailed to pt for Miralax w/ dul as was scheduled in 5/2022

## 2022-08-05 ENCOUNTER — HOSPITAL ENCOUNTER (OUTPATIENT)
Dept: GASTROENTEROLOGY | Facility: AMBULATORY SURGERY CENTER | Age: 47
Discharge: HOME | End: 2022-08-05
Payer: COMMERCIAL

## 2022-08-05 ENCOUNTER — ANESTHESIA (OUTPATIENT)
Dept: GASTROENTEROLOGY | Facility: AMBULATORY SURGERY CENTER | Age: 47
End: 2022-08-05

## 2022-08-05 ENCOUNTER — ANESTHESIA EVENT (OUTPATIENT)
Dept: GASTROENTEROLOGY | Facility: AMBULATORY SURGERY CENTER | Age: 47
End: 2022-08-05

## 2022-08-05 VITALS
BODY MASS INDEX: 33.96 KG/M2 | SYSTOLIC BLOOD PRESSURE: 117 MMHG | DIASTOLIC BLOOD PRESSURE: 80 MMHG | TEMPERATURE: 96.9 F | RESPIRATION RATE: 18 BRPM | WEIGHT: 173 LBS | HEIGHT: 60 IN | HEART RATE: 83 BPM | OXYGEN SATURATION: 97 %

## 2022-08-05 DIAGNOSIS — K21.9 GASTROESOPHAGEAL REFLUX DISEASE WITHOUT ESOPHAGITIS: ICD-10-CM

## 2022-08-05 DIAGNOSIS — Z12.11 COLON CANCER SCREENING: ICD-10-CM

## 2022-08-05 PROCEDURE — 43239 EGD BIOPSY SINGLE/MULTIPLE: CPT | Performed by: INTERNAL MEDICINE

## 2022-08-05 PROCEDURE — 88342 IMHCHEM/IMCYTCHM 1ST ANTB: CPT | Performed by: PATHOLOGY

## 2022-08-05 PROCEDURE — 88305 TISSUE EXAM BY PATHOLOGIST: CPT | Performed by: PATHOLOGY

## 2022-08-05 PROCEDURE — 88341 IMHCHEM/IMCYTCHM EA ADD ANTB: CPT | Performed by: PATHOLOGY

## 2022-08-05 PROCEDURE — 45380 COLONOSCOPY AND BIOPSY: CPT | Performed by: INTERNAL MEDICINE

## 2022-08-05 RX ORDER — LIDOCAINE HYDROCHLORIDE 20 MG/ML
INJECTION, SOLUTION EPIDURAL; INFILTRATION; INTRACAUDAL; PERINEURAL AS NEEDED
Status: DISCONTINUED | OUTPATIENT
Start: 2022-08-05 | End: 2022-08-05

## 2022-08-05 RX ORDER — SODIUM CHLORIDE 9 MG/ML
INJECTION, SOLUTION INTRAVENOUS CONTINUOUS PRN
Status: DISCONTINUED | OUTPATIENT
Start: 2022-08-05 | End: 2022-08-05

## 2022-08-05 RX ORDER — MULTIVITAMIN
1 TABLET ORAL DAILY
COMMUNITY

## 2022-08-05 RX ORDER — PROPOFOL 10 MG/ML
INJECTION, EMULSION INTRAVENOUS AS NEEDED
Status: DISCONTINUED | OUTPATIENT
Start: 2022-08-05 | End: 2022-08-05

## 2022-08-05 RX ADMIN — SODIUM CHLORIDE: 9 INJECTION, SOLUTION INTRAVENOUS at 09:05

## 2022-08-05 RX ADMIN — PROPOFOL 50 MG: 10 INJECTION, EMULSION INTRAVENOUS at 09:21

## 2022-08-05 RX ADMIN — PROPOFOL 50 MG: 10 INJECTION, EMULSION INTRAVENOUS at 09:24

## 2022-08-05 RX ADMIN — PROPOFOL 50 MG: 10 INJECTION, EMULSION INTRAVENOUS at 09:22

## 2022-08-05 RX ADMIN — PROPOFOL 50 MG: 10 INJECTION, EMULSION INTRAVENOUS at 09:23

## 2022-08-05 RX ADMIN — LIDOCAINE HYDROCHLORIDE 100 MG: 20 INJECTION, SOLUTION EPIDURAL; INFILTRATION; INTRACAUDAL; PERINEURAL at 09:22

## 2022-08-05 RX ADMIN — PROPOFOL 25 MG: 10 INJECTION, EMULSION INTRAVENOUS at 09:27

## 2022-08-05 RX ADMIN — PROPOFOL 50 MG: 10 INJECTION, EMULSION INTRAVENOUS at 09:25

## 2022-08-05 NOTE — ANESTHESIA POSTPROCEDURE EVALUATION
Post-Op Assessment Note    CV Status:  Stable    Pain management: adequate     Mental Status:  Awake and sleepy   Hydration Status:  Euvolemic   PONV Controlled:  Controlled   Airway Patency:  Patent      Post Op Vitals Reviewed: Yes      Staff: CRNA, Anesthesiologist         No complications documented      /76 (08/05/22 0949)    Temp     Pulse 81 (08/05/22 0949)   Resp 16 (08/05/22 0949)    SpO2 98 % (08/05/22 0949)

## 2022-08-05 NOTE — ANESTHESIA PREPROCEDURE EVALUATION
Procedure:  EGD  COLONOSCOPY    Relevant Problems   GI/HEPATIC   (+) Rectal bleeding        Physical Exam    Airway    Mallampati score: II  TM Distance: >3 FB  Neck ROM: full     Dental   No notable dental hx     Cardiovascular  Cardiovascular exam normal    Pulmonary  Pulmonary exam normal     Other Findings        Anesthesia Plan  ASA Score- 2     Anesthesia Type- IV sedation with anesthesia with ASA Monitors  Additional Monitors:   Airway Plan:           Plan Factors-Exercise tolerance (METS): >4 METS  Chart reviewed  Imaging results reviewed  Existing labs reviewed  Patient summary reviewed  Patient is not a current smoker  Induction-     Postoperative Plan-     Informed Consent- Anesthetic plan and risks discussed with patient  I personally reviewed this patient with the CRNA  Discussed and agreed on the Anesthesia Plan with the CRNA  Alexander Fountain

## 2022-08-05 NOTE — H&P
History and Physical - [facility]  Jim Morrison 55 y o  male MRN: [id number]                  HPI: Jim Morrison is a 55y o  year old male who presents for distant history of intestinal metaplasia lower esophagus, gastroesophageal reflux, screening colonoscopy      REVIEW OF SYSTEMS: Per the HPI, and otherwise unremarkable  Historical Information   Past Medical History:   Diagnosis Date    Peñaloza's esophagus     Chronic sinusitis     Chronic throat clearing     DNS (deviated nasal septum)     Dysphagia     ETD (eustachian tube dysfunction)     GERD (gastroesophageal reflux disease)     Globus sensation     HL (hearing loss)     Hyperlipemia     Neck pain on left side     PND (post-nasal drip)     Rhinitis     Sleep difficulties     snoring     Past Surgical History:   Procedure Laterality Date    APPENDECTOMY      COLONOSCOPY      UPPER GASTROINTESTINAL ENDOSCOPY       Social History   Social History     Substance and Sexual Activity   Alcohol Use Yes    Alcohol/week: 2 0 standard drinks    Types: 2 Glasses of wine per week     Social History     Substance and Sexual Activity   Drug Use Never     Social History     Tobacco Use   Smoking Status Former Smoker    Types: Cigarettes    Quit date:     Years since quittin 5   Smokeless Tobacco Never Used     History reviewed  No pertinent family history      Meds/Allergies       Current Outpatient Medications:     atorvastatin (LIPITOR) 10 mg tablet    Multiple Vitamin (multivitamin) tablet    omeprazole (PriLOSEC) 40 MG capsule    fluticasone (FLONASE) 50 mcg/act nasal spray    No Known Allergies    Objective     /87   Pulse 88   Temp (!) 96 9 °F (36 1 °C) (Skin)   Resp 18   Ht 5' (1 524 m)   Wt 78 5 kg (173 lb)   SpO2 99%   BMI 33 79 kg/m²       PHYSICAL EXAM    Gen: NAD  Head: NCAT  CV: RRR  CHEST: Clear  ABD: soft, NT/ND  EXT: no edema      ASSESSMENT/PLAN:  This is a 55y o  year old male here for EGD and colonoscopy, and he is stable and optimized for his procedure

## 2022-10-11 PROBLEM — Z12.11 COLON CANCER SCREENING: Status: RESOLVED | Noted: 2022-02-22 | Resolved: 2022-10-11

## 2022-10-12 PROBLEM — Z12.5 PROSTATE CANCER SCREENING: Status: RESOLVED | Noted: 2021-08-05 | Resolved: 2022-10-12

## 2023-03-25 ENCOUNTER — APPOINTMENT (EMERGENCY)
Dept: RADIOLOGY | Facility: HOSPITAL | Age: 48
End: 2023-03-25

## 2023-03-25 ENCOUNTER — HOSPITAL ENCOUNTER (EMERGENCY)
Facility: HOSPITAL | Age: 48
Discharge: HOME | End: 2023-03-25
Attending: EMERGENCY MEDICINE

## 2023-03-25 VITALS
SYSTOLIC BLOOD PRESSURE: 138 MMHG | BODY MASS INDEX: 27.32 KG/M2 | DIASTOLIC BLOOD PRESSURE: 108 MMHG | HEIGHT: 66 IN | OXYGEN SATURATION: 94 % | TEMPERATURE: 98.1 F | WEIGHT: 170 LBS | HEART RATE: 121 BPM | RESPIRATION RATE: 16 BRPM

## 2023-03-25 DIAGNOSIS — S80.11XA CONTUSION OF RIGHT LOWER EXTREMITY, INITIAL ENCOUNTER: ICD-10-CM

## 2023-03-25 DIAGNOSIS — T07.XXXA MULTIPLE ABRASIONS: ICD-10-CM

## 2023-03-25 DIAGNOSIS — S70.10XA: ICD-10-CM
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
## 2023-03-25 NOTE — Clinical Note
Melinda Val was seen and treated in our emergency department on 3/25/2023  Diagnosis:     Santos Peter  may return to work on return date  He may return on this date: 03/27/2023         If you have any questions or concerns, please don't hesitate to call        Kate Krueger MD    ______________________________           _______________          _______________  Hospital Representative                              Date                                Time

## 2023-03-26 NOTE — ED PROVIDER NOTES
History  Chief Complaint   Patient presents with   • Fall     Pt states he fell down a ladder approx 7 hours ago, denies hitting his head, complains of pain in b/l legs       History provided by:  Medical records and patient  Fall  Mechanism of injury: fall    Mechanism of injury comment:  Sharion Blade from a ladder, was replacing a light box on the roof, straight ladder gave way, slid down to the ground  Injury location:  Leg  Leg injury location:  L upper leg and R lower leg  Incident location:  Home  Time since incident:  7 hours  Arrived directly from scene: no    Fall:     Fall occurred:  From a ladder    Height of fall:  5 ft    Impact surface:  Hard floor    Point of impact: Legs and hands  Entrapped after fall: no    Protective equipment: none    Suspicion of alcohol use: no    Suspicion of drug use: no    Tetanus status:  Up to date  Prior to arrival data:     Bystander interventions:  None    Patient ambulatory at scene: yes      Blood loss:  Minimal    Responsiveness at scene:  Alert    Orientation at scene:  Person, place, situation and time    Loss of consciousness: no      Amnesic to event: no      Airway interventions:  None    Airway condition since incident:  Stable    Breathing condition since incident:  Stable    Circulation condition since incident:  Stable    Mental status condition since incident:  Stable    Disability condition since incident:  Stable  Associated symptoms: no abdominal pain, no back pain, no chest pain, no headaches, no nausea, no seizures and no vomiting    Associated symptoms comment:  Abrasions to his right lower leg anteriorly, left posterior thigh, left anterior thigh      Prior to Admission Medications   Prescriptions Last Dose Informant Patient Reported? Taking?    Multiple Vitamin (multivitamin) tablet   Yes No   Sig: Take 1 tablet by mouth daily   albuterol (PROVENTIL HFA,VENTOLIN HFA) 90 mcg/act inhaler   Yes No   Sig: INHALE 2 PUFFS EVERY 6 HOURS AS NEEDED FOR WHEEZING atorvastatin (LIPITOR) 10 mg tablet   Yes No   Sig: Take 10 mg by mouth daily   fluticasone (FLONASE) 50 mcg/act nasal spray   No No   Sig: ADMINISTER 2 SPRAYS INTO NOSTRIL DAILY   omeprazole (PriLOSEC) 40 MG capsule   No No   Sig: TAKE 1 CAPSULE (40 MG TOTAL) BY MOUTH DAILY  Facility-Administered Medications: None       Past Medical History:   Diagnosis Date   • Peñaloza's esophagus    • Chronic sinusitis    • Chronic throat clearing    • DNS (deviated nasal septum)    • Dysphagia    • ETD (eustachian tube dysfunction)    • GERD (gastroesophageal reflux disease)    • Globus sensation    • HL (hearing loss)    • Hyperlipemia    • Neck pain on left side    • PND (post-nasal drip)    • Rhinitis    • Sleep difficulties     snoring       Past Surgical History:   Procedure Laterality Date   • APPENDECTOMY     • COLONOSCOPY     • UPPER GASTROINTESTINAL ENDOSCOPY         History reviewed  No pertinent family history  I have reviewed and agree with the history as documented  E-Cigarette/Vaping   • E-Cigarette Use Never User      E-Cigarette/Vaping Substances   • Nicotine No    • THC No    • CBD No    • Flavoring No    • Other No    • Unknown No      Social History     Tobacco Use   • Smoking status: Former     Types: Cigarettes     Quit date:      Years since quittin 2   • Smokeless tobacco: Never   Vaping Use   • Vaping Use: Never used   Substance Use Topics   • Alcohol use: Yes     Alcohol/week: 2 0 standard drinks     Types: 2 Glasses of wine per week   • Drug use: Never       Review of Systems   Constitutional: Negative for appetite change, chills, fatigue and fever  HENT: Negative for ear pain, rhinorrhea, sore throat and trouble swallowing  Eyes: Negative for pain, discharge and visual disturbance  Respiratory: Negative for cough, chest tightness and shortness of breath  Cardiovascular: Negative for chest pain and palpitations     Gastrointestinal: Negative for abdominal pain, nausea and vomiting  Endocrine: Negative for polydipsia, polyphagia and polyuria  Genitourinary: Negative for difficulty urinating, dysuria, hematuria and testicular pain  Musculoskeletal: Negative for arthralgias and back pain  Skin: Positive for wound  Negative for color change and rash  Allergic/Immunologic: Negative for immunocompromised state  Neurological: Negative for dizziness, seizures, syncope, weakness and headaches  Hematological: Negative for adenopathy  Psychiatric/Behavioral: Negative for confusion and dysphoric mood  All other systems reviewed and are negative  Physical Exam  Physical Exam  Vitals and nursing note reviewed  Constitutional:       General: He is not in acute distress  Appearance: Normal appearance  He is not ill-appearing, toxic-appearing or diaphoretic  HENT:      Head: Normocephalic and atraumatic  Nose: Nose normal  No congestion or rhinorrhea  Mouth/Throat:      Mouth: Mucous membranes are moist       Pharynx: Oropharynx is clear  No oropharyngeal exudate or posterior oropharyngeal erythema  Eyes:      General:         Right eye: No discharge  Left eye: No discharge  Cardiovascular:      Rate and Rhythm: Normal rate and regular rhythm  Pulses: Normal pulses  Heart sounds: Normal heart sounds  No murmur heard  No gallop  Comments: +2 DP/PT pulses bilaterally  Pulmonary:      Effort: Pulmonary effort is normal  No respiratory distress  Breath sounds: Normal breath sounds  No stridor  No wheezing, rhonchi or rales  Chest:      Chest wall: No tenderness  Abdominal:      General: Bowel sounds are normal  There is no distension  Palpations: Abdomen is soft  There is no mass  Tenderness: There is no abdominal tenderness  There is no right CVA tenderness, left CVA tenderness, guarding or rebound  Hernia: No hernia is present     Musculoskeletal:         General: Swelling, tenderness and signs of injury present  No deformity  Normal range of motion  Cervical back: Normal range of motion and neck supple  Right lower leg: No edema  Left lower leg: No edema  Comments: Soft compartments of bilateral upper and lower legs  Tenderness to palpation of the left quadriceps region, there is some subtle swelling to his left anterior quadriceps  Full range of motion at both hips, both knees, both ankles  Skin:     General: Skin is warm and dry  Capillary Refill: Capillary refill takes less than 2 seconds  Comments: Superficial linear abrasion to the left anterior thigh, large rectangular superficial abrasion to the left posterior thigh, skin avulsion to the right anterior shin   Neurological:      General: No focal deficit present  Mental Status: He is alert and oriented to person, place, and time  Cranial Nerves: No cranial nerve deficit  Sensory: No sensory deficit  Motor: No weakness  Coordination: Coordination normal       Gait: Gait normal       Deep Tendon Reflexes: Reflexes normal    Psychiatric:         Mood and Affect: Mood normal          Behavior: Behavior normal          Thought Content:  Thought content normal          Judgment: Judgment normal          Vital Signs  ED Triage Vitals [03/25/23 2105]   Temperature Pulse Respirations Blood Pressure SpO2   98 1 °F (36 7 °C) (!) 121 16 (!) 138/108 94 %      Temp Source Heart Rate Source Patient Position - Orthostatic VS BP Location FiO2 (%)   Temporal Monitor Sitting Left arm --      Pain Score       6           Vitals:    03/25/23 2105   BP: (!) 138/108   Pulse: (!) 121   Patient Position - Orthostatic VS: Sitting         Visual Acuity      ED Medications  Medications - No data to display    Diagnostic Studies  Results Reviewed     None                 XR tibia fibula 2 views RIGHT    (Results Pending)   XR femur 2 views LEFT    (Results Pending)              Procedures  Procedures         ED Course Medical Decision Making  2111: Patient appears well, vital signs reviewed  Patient had a fall from a ladder approximately 7 hours prior to arrival   Only complaints are skin abrasions and soreness to his left quadriceps  No other trauma of note to the trunk, head or neck  Plan to complete x-rays of his left femur and right tib-fib regions  Soft compartments of bilateral upper and lower extremities  Tetanus up-to-date  Wound care instructed  Offered analgesics, patient declines  2158: X-rays reviewed, no fracture identified  Amount and/or Complexity of Data Reviewed  Radiology: ordered  Disposition  Final diagnoses:   Fall, initial encounter   Contusion, thigh - Left   Multiple abrasions   Contusion of right lower extremity, initial encounter     Time reflects when diagnosis was documented in both MDM as applicable and the Disposition within this note     Time User Action Codes Description Comment    3/25/2023  9:58 PM Elizabeth Aspermont Add [W19  QVRL] Fall, initial encounter     3/25/2023  9:58 PM Elizabeth Aspermont Add [S70 10XA] Contusion, thigh     3/25/2023  9:58 PM Elizabeth Aspermont Add [T07  XXXA] Multiple abrasions     3/25/2023  9:59 PM Elizabeth Aspermont Add [S80 11XA] Contusion of right lower extremity, initial encounter     3/25/2023  9:59 PM Desmond Mantillat [S70 10XA] Contusion, thigh Left      ED Disposition     ED Disposition   Discharge    Condition   Stable    Date/Time   Sat Mar 25, 2023  9:58 PM    Comment   2015 Tod Marcano discharge to home/self care  Follow-up Information     Follow up With Specialties Details Why Contact Info    Jacques Bermeo MD Family Medicine Schedule an appointment as soon as possible for a visit  As needed [address]  [phone]            Patient's Medications   Discharge Prescriptions    No medications on file       No discharge procedures on file      PDMP Review     None ED Provider  Electronically Signed by           Akin Saldaña MD  03/25/23 2346

## 2023-12-06 ENCOUNTER — HOSPITAL ENCOUNTER (OUTPATIENT)
Dept: CT IMAGING | Facility: HOSPITAL | Age: 48
Discharge: HOME | End: 2023-12-06
Payer: COMMERCIAL

## 2023-12-06 DIAGNOSIS — R22.1 NECK SWELLING: ICD-10-CM

## 2023-12-06 PROCEDURE — 70491 CT SOFT TISSUE NECK W/DYE: CPT

## 2023-12-06 PROCEDURE — G1004 CDSM NDSC: HCPCS

## 2023-12-06 RX ADMIN — IOHEXOL 85 ML: 350 INJECTION, SOLUTION INTRAVENOUS at 16:39

## 2024-05-20 ENCOUNTER — TELEPHONE (OUTPATIENT)
Age: 49
End: 2024-05-20

## 2024-05-20 NOTE — TELEPHONE ENCOUNTER
Pt calling in to schedule f/u appt in [place]. Went over providers schedules and next avail was the end of July and offered wait list. Pt did not want to wait that long and will call back if he decides to schedule with [facility].

## 2024-08-07 ENCOUNTER — TELEPHONE (OUTPATIENT)
Age: 49
End: 2024-08-07

## 2024-08-07 NOTE — TELEPHONE ENCOUNTER
Patient calling in inquiring if he can get in for a sooner appt. Informed that there are no appointments available sooner.

## 2024-08-07 NOTE — TELEPHONE ENCOUNTER
New Patient    What is the reason for the patient's appointment?: Blood in Semen, sporadically since May    What office location does the patient prefer?: [place]    Does patient have Imaging/Lab Results:  No    Have patient records been requested?: N/A  If No, are the records showing in Epic: N/A      INSURANCE:   Do we accept the patient's insurance or is the patient Self-Pay?: Yes    Insurance Provider: [facility]   Plan Type/Number:   Member ID#: [id number]       HISTORY:   Has the patient had any previous Urologist(s)?: [facility] 2021    Was the patient seen in the ED?: No    Has the patient had any outside testing done?: No    Does the patient have a personal history of cancer?: N/A      Pt stated his symptoms occur sporadically since May and he would like to discuss prostate concerns as well.     Pt scheduled for the next available of 8/29/24 with , he will be on vacation all of next week.

## 2024-08-08 ENCOUNTER — OFFICE VISIT (OUTPATIENT)
Age: 49
End: 2024-08-08
Payer: COMMERCIAL

## 2024-08-08 VITALS — WEIGHT: 173 LBS | BODY MASS INDEX: 27.8 KG/M2 | HEIGHT: 66 IN

## 2024-08-08 DIAGNOSIS — K62.5 RECTAL BLEEDING: Primary | ICD-10-CM

## 2024-08-08 PROCEDURE — 46600 DIAGNOSTIC ANOSCOPY SPX: CPT | Performed by: COLON & RECTAL SURGERY

## 2024-08-08 PROCEDURE — 99213 OFFICE O/P EST LOW 20 MIN: CPT | Performed by: COLON & RECTAL SURGERY

## 2024-08-08 NOTE — ASSESSMENT & PLAN NOTE
Patient presents for evaluation of perianal symptoms.  Patient notes he has left-sided pain.  He has had this off and on for some time.  Recently he also had a more difficult bowel movement and afterwards had some degree of rectal bleeding.  Examination shows scarring from prior bowel movement injuries but no dominant anal fissure.  He is tender at the levator level on the left.  He does have some prolapsing hemorrhoidal tissue on anoscopic examination.    On the whole his symptoms have been improving over the past few weeks with dietary and bowel movement directed therapies.  As such I recommend no further workup.  We again reviewed surgical interventions for hemorrhoidal bleeding and other concerns.  He is also undergoing urologic workup for a separate issue.  He will call if symptoms are worsening over time and he wishes for hemorrhoidal or surgical intervention.

## 2024-08-08 NOTE — PROGRESS NOTES
Diagnoses and all orders for this visit:    Rectal bleeding       Rectal bleeding  Patient presents for evaluation of perianal symptoms.  Patient notes he has left-sided pain.  He has had this off and on for some time.  Recently he also had a more difficult bowel movement and afterwards had some degree of rectal bleeding.  Examination shows scarring from prior bowel movement injuries but no dominant anal fissure.  He is tender at the levator level on the left.  He does have some prolapsing hemorrhoidal tissue on anoscopic examination.    On the whole his symptoms have been improving over the past few weeks with dietary and bowel movement directed therapies.  As such I recommend no further workup.  We again reviewed surgical interventions for hemorrhoidal bleeding and other concerns.  He is also undergoing urologic workup for a separate issue.  He will call if symptoms are worsening over time and he wishes for hemorrhoidal or surgical intervention.      JERRY Trevino is a 48 y.o. male here for a follow up to bright red bleeding and sore on outside of rectum.         Patient was last seen on 5/23/22 for grade 2 internal hemorrhoids.           Last colonoscopy done 8/5/22,by Lenard leon MD. With a 7 year recall.    Past Medical History:   Diagnosis Date    Peñaloza's esophagus     Chronic sinusitis     Chronic throat clearing     DNS (deviated nasal septum)     Dysphagia     ETD (eustachian tube dysfunction)     GERD (gastroesophageal reflux disease)     Globus sensation     HL (hearing loss)     Hyperlipemia     Neck pain on left side     PND (post-nasal drip)     Rhinitis     Sleep difficulties     snoring     Past Surgical History:   Procedure Laterality Date    APPENDECTOMY      COLONOSCOPY      UPPER GASTROINTESTINAL ENDOSCOPY         Current Outpatient Medications:     atorvastatin (LIPITOR) 10 mg tablet, Take 10 mg by mouth daily, Disp: , Rfl:     fluticasone (FLONASE) 50 mcg/act nasal spray, ADMINISTER  2 SPRAYS INTO NOSTRIL DAILY, Disp: 48 mL, Rfl: 4    Multiple Vitamin (multivitamin) tablet, Take 1 tablet by mouth daily, Disp: , Rfl:     omeprazole (PriLOSEC) 40 MG capsule, TAKE 1 CAPSULE (40 MG TOTAL) BY MOUTH DAILY., Disp: 30 capsule, Rfl: 0    albuterol (PROVENTIL HFA,VENTOLIN HFA) 90 mcg/act inhaler, INHALE 2 PUFFS EVERY 6 HOURS AS NEEDED FOR WHEEZING (Patient not taking: Reported on 8/8/2024), Disp: , Rfl:     amoxicillin (AMOXIL) 500 mg capsule, TAKE 1 CAPSULE BY MOUTH THREE TIMES A DAY UNTIL FINISHED ( START 4 DAYS PRIOR TO DENTAL APPT ) (Patient not taking: Reported on 11/30/2023), Disp: , Rfl:   Allergies as of 08/08/2024    (No Known Allergies)     Review of Systems   Gastrointestinal:  Positive for anal bleeding and rectal pain.   All other systems reviewed and are negative.    There were no vitals filed for this visit.  Physical Exam  Constitutional:       Appearance: Normal appearance.   HENT:      Head: Normocephalic and atraumatic.   Eyes:      Extraocular Movements: Extraocular movements intact.      Pupils: Pupils are equal, round, and reactive to light.   Pulmonary:      Effort: Pulmonary effort is normal.   Musculoskeletal:         General: Normal range of motion.   Skin:     General: Skin is warm and dry.   Neurological:      General: No focal deficit present.      Mental Status: He is alert and oriented to person, place, and time.   Psychiatric:         Mood and Affect: Mood normal.         Behavior: Behavior normal.         Thought Content: Thought content normal.         Judgment: Judgment normal.     Lower Endoscopy    Date/Time: 8/8/2024 3:20 PM    Performed by: Jose Manuel Phipps MD  Authorized by: Jose Manuel Phipps MD    Verbal consent obtained?: Yes    Risks and benefits: Risks, benefits and alternatives were discussed    Consent given by:  Patient  Indications: rectal bleeding    Patient sedated: No    Scope type:  Anoscope  External exam performed: Yes    Pilonidal sinus  tract: No    Pilonidal cyst: No    Pilonidal tenderness: No    Perianal skin tags: Yes    Perirectal warts: No    Perianal maceration: No    Perianal induration: No    Perianal erythema: No    External hemorrhoids: No    Digital exam performed: Yes    Laxity of anal sphincter: No    Prostate tenderness: No    Internal hemorrhoids: Yes    Prolapsed: No    Intraluminal mass: No    Inflammation: No    Anal fissures: No    Anal fistulae: No    Anal stricture: No    Procedure termination:  Procedure complete  Patient tolerance:  Patient tolerated the procedure well with no immediate complications

## 2024-08-19 PROBLEM — R36.1 HEMATOSPERMIA: Status: ACTIVE | Noted: 2024-08-19

## 2024-08-19 PROBLEM — N40.1 INCOMPLETE EMPTYING OF BLADDER DUE TO BENIGN PROSTATIC HYPERPLASIA: Status: ACTIVE | Noted: 2021-08-05

## 2024-08-19 NOTE — PROGRESS NOTES
UROLOGY PROGRESS NOTE         NAME: Prince Trevino  AGE: 48 y.o. SEX: male  : 1975   MRN: [id number]    DATE: 2024  TIME: 9:41 AM    Assessment and Plan   Procedures     Impression:   1. Hematospermia  2. Acute prostatitis  3. Incomplete emptying of bladder       Plan: Think the patient does have mild prostatitis with the only bothersome symptom at this time would be the recurrent hematospermia.  Going to recommend a course of Levaquin 500 mg once a day for 2 weeks and I will put 1 refill on it if it would recur.    I told the patient if his symptoms would recur after the first refill on the Levaquin he should give me a call or if his symptoms which change.    Recommend if he is doing well he should follow-up with his family doctor at age 50 for an annual FLAKITO PSA.      Chief Complaint     Chief Complaint   Patient presents with    blood in semen     incomplete emtying      History of Present Illness     HPI: Prince Trevino is a 48 y.o. year old male who presents with evaluation of hematospermia.  Telephone call, patient has had blood in semen sporadically since May 2024.  In the past, patient saw [facility] urology nurse practitioner 2021 for incomplete emptying.  The recommendation was avoid bladder irritants increase water and follow-up as needed.  It was noted he had inconsistent urinary strain that was random in occurrence and intermittent sensation of incomplete emptying.  He was to have a PSA at that time.  Do not see that one was ever done.    Recently saw colorectal surgery 2024 for bright red bleeding and a sore on the outside of his rectum.  He has a history of hemorrhoids.  The examination shows scarring from prior bowel movement injuries but no dominant anal fissure.  He also was tender at the levator level on the left.  He also noted some prolapsing hemorrhoidal tissue on anoscopy.    Patient had a CT scan 2023 that showed normal upper tracts normal bladder and a prominent  "prostate    Currently patient saw blood in his semen red color in May and then once in August.  Really no other symptoms he has occasional postvoid dribbling nocturia is related to his fluid intake.  Otherwise no irritable or obstructive complaints.  No family history of prostate cancer no recent PSA he does have some mild ED but able to penetrate his libido is fine.       The following portions of the patient's history were reviewed and updated as appropriate: allergies, current medications, past family history, past medical history, past social history, past surgical history and problem list.  Past Medical History:   Diagnosis Date    Peñaloza's esophagus     Chronic sinusitis     Chronic throat clearing     DNS (deviated nasal septum)     Dysphagia     ETD (eustachian tube dysfunction)     GERD (gastroesophageal reflux disease)     Globus sensation     HL (hearing loss)     Hyperlipemia     Neck pain on left side     PND (post-nasal drip)     Rhinitis     Sleep difficulties     snoring     Past Surgical History:   Procedure Laterality Date    APPENDECTOMY      COLONOSCOPY      UPPER GASTROINTESTINAL ENDOSCOPY       shoulder  Review of Systems     Const: Denies chills, fever and weight loss.  CV: Denies chest pain.  Resp: Denies SOB.  GI: Denies abdominal pain, nausea and vomiting.  : Denies symptoms other than stated above.  Musculo: Denies back pain.    Objective   BP (!) 138/102   Pulse (!) 117   Temp 98.2 °F (36.8 °C)   Ht 5' 6\" (1.676 m)   Wt 79.9 kg (176 lb 3.2 oz)   SpO2 98%   BMI 28.44 kg/m²     Physical Exam  Const: Appears healthy and well developed. No signs of acute distress present.  Resp: Respirations are regular and unlabored.   CV: Rate is regular. Rhythm is regular.  Abdomen: Abdomen is soft, nontender, and nondistended. Kidneys are not palpable.  : Normal external genitalia exam no perineal tenderness the prostate was boggy and tender with burning at the tip of the penis.  Psych: " Patient's attitude is cooperative. Mood is normal. Affect is normal.    Current Medications     Current Outpatient Medications:     amoxicillin (AMOXIL) 500 mg capsule, , Disp: , Rfl:     atorvastatin (LIPITOR) 10 mg tablet, Take 10 mg by mouth daily, Disp: , Rfl:     fluticasone (FLONASE) 50 mcg/act nasal spray, ADMINISTER 2 SPRAYS INTO NOSTRIL DAILY, Disp: 48 mL, Rfl: 4    Multiple Vitamin (multivitamin) tablet, Take 1 tablet by mouth daily, Disp: , Rfl:     omeprazole (PriLOSEC) 40 MG capsule, TAKE 1 CAPSULE (40 MG TOTAL) BY MOUTH DAILY., Disp: 30 capsule, Rfl: 0        Tom Ventura MD

## 2024-08-29 ENCOUNTER — OFFICE VISIT (OUTPATIENT)
Dept: UROLOGY | Facility: CLINIC | Age: 49
End: 2024-08-29
Payer: COMMERCIAL

## 2024-08-29 VITALS
HEIGHT: 66 IN | OXYGEN SATURATION: 98 % | HEART RATE: 117 BPM | BODY MASS INDEX: 28.32 KG/M2 | WEIGHT: 176.2 LBS | TEMPERATURE: 98.2 F | DIASTOLIC BLOOD PRESSURE: 102 MMHG | SYSTOLIC BLOOD PRESSURE: 138 MMHG

## 2024-08-29 DIAGNOSIS — N41.0 ACUTE PROSTATITIS: ICD-10-CM

## 2024-08-29 DIAGNOSIS — R36.1 HEMATOSPERMIA: Primary | ICD-10-CM

## 2024-08-29 DIAGNOSIS — R33.9 INCOMPLETE EMPTYING OF BLADDER: ICD-10-CM

## 2024-08-29 PROCEDURE — 99204 OFFICE O/P NEW MOD 45 MIN: CPT | Performed by: UROLOGY

## 2024-08-29 RX ORDER — LEVOFLOXACIN 500 MG/1
500 TABLET, FILM COATED ORAL EVERY 24 HOURS
Qty: 14 TABLET | Refills: 1 | Status: SHIPPED | OUTPATIENT
Start: 2024-08-29 | End: 2024-09-12

## 2024-08-29 NOTE — PATIENT INSTRUCTIONS
Please take the antibiotic Levaquin 500 mg daily for 14 days.  If the symptoms do not completely resolve regarding the blood in the semen please take the other 14 days.  If the symptoms clear but recur weeks or months later okay to start the Levaquin again and alert the urology office.    If develop other symptoms which would include pain with orgasm, worsening erection issues, or flow issues of urination or urgency frequency or blood in the urine please call urology for appointment.    Commended age 50 doing a PSA and rectal exam with PCP or certainly can be done at our urology office.

## 2024-11-13 ENCOUNTER — OFFICE VISIT (OUTPATIENT)
Dept: GASTROENTEROLOGY | Facility: MEDICAL CENTER | Age: 49
End: 2024-11-13
Payer: COMMERCIAL

## 2024-11-13 VITALS
WEIGHT: 172.1 LBS | BODY MASS INDEX: 27.66 KG/M2 | OXYGEN SATURATION: 98 % | SYSTOLIC BLOOD PRESSURE: 135 MMHG | HEART RATE: 106 BPM | TEMPERATURE: 98.4 F | DIASTOLIC BLOOD PRESSURE: 101 MMHG | HEIGHT: 66 IN

## 2024-11-13 DIAGNOSIS — R10.32 LLQ PAIN: Primary | ICD-10-CM

## 2024-11-13 DIAGNOSIS — R03.0 ELEVATED BLOOD PRESSURE READING: ICD-10-CM

## 2024-11-13 DIAGNOSIS — R12 HEARTBURN: ICD-10-CM

## 2024-11-13 PROCEDURE — 99214 OFFICE O/P EST MOD 30 MIN: CPT | Performed by: INTERNAL MEDICINE

## 2024-11-13 NOTE — PROGRESS NOTES
Ambulatory Visit  Name: Prince Trevino      : 1975      MRN: [id number]  Encounter Provider: Nick Romano MD  Encounter Date: 2024   Encounter department: [facility] [place]    Assessment & Plan  LLQ pain  Patient had similar LLQ pain last year s/p CT A/P. Unable to review the actual imaging as it was done in a different facility but report mentions normal small and large bowel without bowel obstruction and noted. Intermittent LLQ pain which is not related to food intake or defecation along with positive Carnett's sign suggest that this pain is more MSK related pain. Recommend evaluation for trigger point injection. Given the patient reports mild improvement with Gas-X, trial of low FODMAP diet.  - refer to pain management for possible trigger point injection  - trial of low FODMAP diet  Orders:    Ambulatory referral to Spine & Pain Management; Future    Heartburn  I have personally reviewed the pertinent lab values, imaging reports and endoscopy/pathology reports. Not able to find his very first EGD report but able to review pathology. Patient has possible hx of Peñaloza's esophagus. Unclear if it was irregular Z-line vs short segment BE. He was advised to repeat EGD and colonoscopy in 5 years from .   - plan to repeat EGD in   - take omeprazole 20 mg every other day before breakfast given possible Peñaloza's esophagus     Orders:    omeprazole (PriLOSEC) 20 mg delayed release capsule; Take 1 capsule (20 mg total) by mouth daily before breakfast    Elevated blood pressure reading  - advised to follow up with his PCP           History of Present Illness     Prince Trevino is a 48 y.o. male PMH appy, HLD, grade 2 internal hemorrhoids, small perianal skin tags, possible Peñaloza's esophagus, who presents with LLQ pain    Last seen by Dr. 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 pylori    Left-sided abdominal pain for the past month. LLQ pain. Also back pain. Still with back pain. No fevers or vomiting or weight loss. Intermittent pain. Working on his car the weekend prior to it. If putting pressure on LLQ, he feels the discomfort. Sharp pain at times. When it is severe, 3-4 out of 10 in severity.   Pain not related to food intake or defecation. When passing gas, it may be better but unclear.  Tried NSAID without relief. Tried gas-X with perhaps mild relief. Patient was recently prescribed Levsin so took several doses but unsure if it helped.   Similar pain in the past. Had CT scan at that time which was unremarkable.     Patient carries a diagnosis of Peñaloza's esophagus but unclear if he had true Peñaloza's or irregular Z-line. Currently taking Prilosec very seldomly. No heartburn at this time. Takes Gaviscon as needed.       History obtained from : patient  Review of Systems  Current Outpatient Medications on File Prior to Visit   Medication Sig Dispense Refill    atorvastatin (LIPITOR) 10 mg tablet Take 10 mg by mouth daily      fluticasone (FLONASE) 50 mcg/act nasal spray ADMINISTER 2 SPRAYS INTO NOSTRIL DAILY 48 mL 4    Multiple Vitamin (multivitamin) tablet Take 1 tablet by mouth daily      [DISCONTINUED] omeprazole (PriLOSEC) 40 MG capsule TAKE 1 CAPSULE (40 MG TOTAL) BY MOUTH DAILY. 30 capsule 0    [DISCONTINUED] amoxicillin (AMOXIL) 500 mg capsule        No current facility-administered medications on file prior to visit.          Objective     BP (!) 135/101 (Patient Position: Sitting, Cuff Size: Standard)   Pulse (!) 106   Temp 98.4 °F (36.9 °C) (Tympanic)   Ht 5' 6\" (1.676 m)   Wt 78.1 kg (172 lb 1.6 oz)   SpO2 98%   BMI 27.78 kg/m²     Physical Exam  Vitals reviewed.   Constitutional:       General: He is not in acute distress.     Appearance: Normal appearance. "   HENT:      Head: Normocephalic and atraumatic.   Eyes:      Extraocular Movements: Extraocular movements intact.   Cardiovascular:      Rate and Rhythm: Tachycardia present.   Abdominal:      General: There is no distension.      Palpations: Abdomen is soft.      Tenderness: There is abdominal tenderness (positive Carnett's sign in LLQ) in the left lower quadrant.   Musculoskeletal:         General: No swelling.      Cervical back: Normal range of motion.   Skin:     General: Skin is warm and dry.   Neurological:      General: No focal deficit present.      Mental Status: He is alert.

## 2024-12-06 DIAGNOSIS — I25.10 ATHEROSCLEROTIC HEART DISEASE OF NATIVE CORONARY ARTERY WITHOUT ANGINA PECTORIS: ICD-10-CM

## 2024-12-06 DIAGNOSIS — R07.9 CHEST PAIN, UNSPECIFIED: ICD-10-CM

## 2024-12-30 ENCOUNTER — HOSPITAL ENCOUNTER (OUTPATIENT)
Dept: NON INVASIVE DIAGNOSTICS | Facility: HOSPITAL | Age: 49
Discharge: HOME | End: 2024-12-30
Attending: INTERNAL MEDICINE
Payer: COMMERCIAL

## 2024-12-30 DIAGNOSIS — I25.10 ATHEROSCLEROTIC HEART DISEASE OF NATIVE CORONARY ARTERY WITHOUT ANGINA PECTORIS: ICD-10-CM

## 2024-12-30 DIAGNOSIS — R07.9 CHEST PAIN, UNSPECIFIED: ICD-10-CM

## 2024-12-30 LAB
CHEST PAIN STATEMENT: NORMAL
MAX DIASTOLIC BP: 80 MMHG
MAX HR PERCENT: 104 %
MAX HR: 179 BPM
MAX PREDICTED HEART RATE: 171 BPM
PROTOCOL NAME: NORMAL
RATE PRESSURE PRODUCT: NORMAL
REASON FOR TERMINATION: NORMAL
SL CV STRESS RECOVERY BP: NORMAL MMHG
SL CV STRESS RECOVERY HR: 102 BPM
SL CV STRESS RECOVERY O2 SAT: 98 %
SL CV STRESS STAGE REACHED: 4
STRESS ANGINA INDEX: 0
STRESS BASELINE BP: NORMAL MMHG
STRESS BASELINE HR: 100 BPM
STRESS O2 SAT REST: 98 %
STRESS PEAK HR: 179 BPM
STRESS POST ESTIMATED WORKLOAD: 13.4 METS
STRESS POST EXERCISE DUR MIN: 10 MIN
STRESS POST EXERCISE DUR MIN: 10 MIN
STRESS POST EXERCISE DUR SEC: 20 SEC
STRESS POST EXERCISE DUR SEC: 20 SEC
STRESS POST O2 SAT PEAK: 98 %
STRESS POST PEAK BP: 148 MMHG
STRESS POST PEAK HR: 179 BPM
STRESS POST PEAK SYSTOLIC BP: 148 MMHG
TARGET HR FORMULA: NORMAL
TEST INDICATION: NORMAL

## 2024-12-30 PROCEDURE — 93018 CV STRESS TEST I&R ONLY: CPT | Performed by: INTERNAL MEDICINE

## 2024-12-30 PROCEDURE — 93016 CV STRESS TEST SUPVJ ONLY: CPT | Performed by: INTERNAL MEDICINE

## 2024-12-30 PROCEDURE — 93017 CV STRESS TEST TRACING ONLY: CPT

## 2024-12-30 RX ORDER — LOSARTAN POTASSIUM 25 MG/1
25 TABLET ORAL DAILY
COMMUNITY
Start: 2024-12-06

## 2025-02-20 ENCOUNTER — OFFICE VISIT (OUTPATIENT)
Dept: URGENT CARE | Facility: CLINIC | Age: 50
End: 2025-02-20
Payer: COMMERCIAL

## 2025-02-20 VITALS
RESPIRATION RATE: 18 BRPM | TEMPERATURE: 96.7 F | SYSTOLIC BLOOD PRESSURE: 116 MMHG | DIASTOLIC BLOOD PRESSURE: 78 MMHG | HEART RATE: 93 BPM | HEIGHT: 66 IN | WEIGHT: 174.4 LBS | BODY MASS INDEX: 28.03 KG/M2 | OXYGEN SATURATION: 96 %

## 2025-02-20 DIAGNOSIS — J01.40 ACUTE PANSINUSITIS, RECURRENCE NOT SPECIFIED: Primary | ICD-10-CM

## 2025-02-20 PROCEDURE — 99203 OFFICE O/P NEW LOW 30 MIN: CPT

## 2025-02-20 RX ORDER — FLUTICASONE PROPIONATE 50 MCG
1 SPRAY, SUSPENSION (ML) NASAL DAILY
Qty: 11.1 ML | Refills: 0 | Status: SHIPPED | OUTPATIENT
Start: 2025-02-20

## 2025-02-21 NOTE — PROGRESS NOTES
[facility]        NAME: Prince Trevino is a 49 y.o. male  : 1975    MRN: [id number]  DATE: 2025  TIME: 7:44 PM    Assessment and Plan   Acute pansinusitis, recurrence not specified [J01.40]  1. Acute pansinusitis, recurrence not specified  amoxicillin-clavulanate (AUGMENTIN) 875-125 mg per tablet    fluticasone (FLONASE) 50 mcg/act nasal spray            Patient Instructions     Patient Instructions   Tylenol Sinus over the counter  Warm compresses over sinuses  Steam treatment (practice proper safety precautions when handing hot liquids/steam)  Over the counter saline nasal spray  Follow up with PCP in 3-5 days.  Proceed to  ER if symptoms worsen.    Eat yogurt with live and active cultures and/or take a probiotic at least 3 hours before or after antibiotic dose. Monitor stool for diarrhea and/or blood. If this occurs, contact primary care doctor ASAP.       Follow up with PCP in 3-5 days.  Proceed to  ER if symptoms worsen.    Chief Complaint     Chief Complaint   Patient presents with    Cold Like Symptoms     Sinus pressure and congestion x 2 weeks          History of Present Illness       49-year-old male presenting with sinus pressure and pain x 2 weeks.  Patient reports using ibuprofen for pain.  Patient denies any sick contacts.  Patient reports history of sinus infections.          Review of Systems   Review of Systems   Constitutional:  Negative for chills, fatigue and fever.   HENT:  Positive for congestion, sinus pressure and sinus pain. Negative for ear pain, rhinorrhea and sore throat.    Respiratory:  Positive for cough. Negative for shortness of breath.    Cardiovascular:  Negative for chest pain and palpitations.   Gastrointestinal:  Negative for abdominal pain, diarrhea, nausea and vomiting.   Musculoskeletal:  Negative for arthralgias and myalgias.   Neurological:  Positive for headaches. Negative for light-headedness.         Current Medications       Current  "Outpatient Medications:     amoxicillin-clavulanate (AUGMENTIN) 875-125 mg per tablet, Take 1 tablet by mouth every 12 (twelve) hours for 7 days, Disp: 14 tablet, Rfl: 0    atorvastatin (LIPITOR) 10 mg tablet, Take 10 mg by mouth daily, Disp: , Rfl:     fluticasone (FLONASE) 50 mcg/act nasal spray, 1 spray into each nostril daily, Disp: 11.1 mL, Rfl: 0    losartan (COZAAR) 25 mg tablet, Take 25 mg by mouth daily, Disp: , Rfl:     omeprazole (PriLOSEC) 20 mg delayed release capsule, Take 1 capsule (20 mg total) by mouth daily before breakfast, Disp: 30 capsule, Rfl: 11    Current Allergies     Allergies as of 02/20/2025    (No Known Allergies)            The following portions of the patient's history were reviewed and updated as appropriate: allergies, current medications, past family history, past medical history, past social history, past surgical history and problem list.     Past Medical History:   Diagnosis Date    Peñaloza's esophagus     Chronic sinusitis     Chronic throat clearing     DNS (deviated nasal septum)     Dysphagia     ETD (eustachian tube dysfunction)     GERD (gastroesophageal reflux disease)     Globus sensation     HL (hearing loss)     Hyperlipemia     Neck pain on left side     PND (post-nasal drip)     Rhinitis     Sleep difficulties     snoring       Past Surgical History:   Procedure Laterality Date    APPENDECTOMY      COLONOSCOPY      UPPER GASTROINTESTINAL ENDOSCOPY         Family History   Problem Relation Age of Onset    Heart attack Father     Heart disease Father         Stent blockage         Medications have been verified.        Objective   /78   Pulse 93   Temp (!) 96.7 °F (35.9 °C) (Temporal)   Resp 18   Ht 5' 6\" (1.676 m)   Wt 79.1 kg (174 lb 6.4 oz)   SpO2 96%   BMI 28.15 kg/m²        Physical Exam     Physical Exam  Vitals and nursing note reviewed.   Constitutional:       General: He is not in acute distress.     Appearance: He is normal weight. He is " ill-appearing.   HENT:      Head: Normocephalic and atraumatic.      Comments: Axillary and frontal sinus tenderness bilaterally.     Right Ear: Tympanic membrane, ear canal and external ear normal.      Left Ear: Tympanic membrane, ear canal and external ear normal.      Nose: Nose normal. No congestion.      Mouth/Throat:      Mouth: Mucous membranes are moist.      Pharynx: Oropharynx is clear. No posterior oropharyngeal erythema.   Eyes:      Conjunctiva/sclera: Conjunctivae normal.      Pupils: Pupils are equal, round, and reactive to light.   Cardiovascular:      Rate and Rhythm: Normal rate and regular rhythm.      Pulses: Normal pulses.      Heart sounds: Normal heart sounds.   Pulmonary:      Effort: Pulmonary effort is normal.      Breath sounds: Normal breath sounds. No wheezing.   Abdominal:      General: Abdomen is flat. Bowel sounds are normal.      Palpations: Abdomen is soft.      Tenderness: There is no abdominal tenderness.   Lymphadenopathy:      Cervical: No cervical adenopathy.   Skin:     General: Skin is warm and dry.      Capillary Refill: Capillary refill takes less than 2 seconds.   Neurological:      General: No focal deficit present.      Mental Status: He is alert and oriented to person, place, and time.   Psychiatric:         Mood and Affect: Mood normal.         Behavior: Behavior normal.

## 2025-02-21 NOTE — PATIENT INSTRUCTIONS
Tylenol Sinus over the counter  Warm compresses over sinuses  Steam treatment (practice proper safety precautions when handing hot liquids/steam)  Over the counter saline nasal spray  Follow up with PCP in 3-5 days.  Proceed to  ER if symptoms worsen.    Eat yogurt with live and active cultures and/or take a probiotic at least 3 hours before or after antibiotic dose. Monitor stool for diarrhea and/or blood. If this occurs, contact primary care doctor ASAP.   
s/p R.A partial nephrectomy

## 2025-03-15 ENCOUNTER — NURSE TRIAGE (OUTPATIENT)
Dept: OTHER | Facility: OTHER | Age: 50
End: 2025-03-15

## 2025-03-15 ENCOUNTER — APPOINTMENT (EMERGENCY)
Dept: CT IMAGING | Facility: HOSPITAL | Age: 50
End: 2025-03-15
Payer: COMMERCIAL

## 2025-03-15 ENCOUNTER — HOSPITAL ENCOUNTER (EMERGENCY)
Facility: HOSPITAL | Age: 50
Discharge: HOME | End: 2025-03-15
Attending: EMERGENCY MEDICINE
Payer: COMMERCIAL

## 2025-03-15 VITALS
OXYGEN SATURATION: 98 % | DIASTOLIC BLOOD PRESSURE: 84 MMHG | RESPIRATION RATE: 12 BRPM | SYSTOLIC BLOOD PRESSURE: 126 MMHG | TEMPERATURE: 97.7 F | WEIGHT: 147 LBS | BODY MASS INDEX: 23.73 KG/M2 | HEART RATE: 108 BPM

## 2025-03-15 DIAGNOSIS — K52.9 COLITIS: Primary | ICD-10-CM

## 2025-03-15 LAB
ALBUMIN SERPL BCG-MCNC: 4.6 G/DL (ref 3.5–5)
ALP SERPL-CCNC: 109 U/L (ref 34–104)
ALT SERPL W P-5'-P-CCNC: 41 U/L (ref 7–52)
ANION GAP SERPL CALCULATED.3IONS-SCNC: 8 MMOL/L (ref 4–13)
APTT PPP: 30 SECONDS (ref 23–34)
AST SERPL W P-5'-P-CCNC: 25 U/L (ref 13–39)
BASOPHILS # BLD AUTO: 0.04 THOUSANDS/ÂΜL (ref 0–0.1)
BASOPHILS NFR BLD AUTO: 0 % (ref 0–1)
BILIRUB SERPL-MCNC: 0.84 MG/DL (ref 0.2–1)
BILIRUB UR QL STRIP: NEGATIVE
BUN SERPL-MCNC: 19 MG/DL (ref 5–25)
CALCIUM SERPL-MCNC: 9.5 MG/DL (ref 8.4–10.2)
CHLORIDE SERPL-SCNC: 106 MMOL/L (ref 96–108)
CLARITY UR: CLEAR
CO2 SERPL-SCNC: 25 MMOL/L (ref 21–32)
COLOR UR: YELLOW
CREAT SERPL-MCNC: 0.96 MG/DL (ref 0.6–1.3)
EOSINOPHIL # BLD AUTO: 0.1 THOUSAND/ÂΜL (ref 0–0.61)
EOSINOPHIL NFR BLD AUTO: 1 % (ref 0–6)
ERYTHROCYTE [DISTWIDTH] IN BLOOD BY AUTOMATED COUNT: 13.4 % (ref 11.6–15.1)
GFR SERPL CREATININE-BSD FRML MDRD: 92 ML/MIN/1.73SQ M
GLUCOSE SERPL-MCNC: 112 MG/DL (ref 65–140)
GLUCOSE UR STRIP-MCNC: NEGATIVE MG/DL
HCT VFR BLD AUTO: 49.1 % (ref 36.5–49.3)
HGB BLD-MCNC: 16.6 G/DL (ref 12–17)
HGB UR QL STRIP.AUTO: NEGATIVE
IMM GRANULOCYTES # BLD AUTO: 0.06 THOUSAND/UL (ref 0–0.2)
IMM GRANULOCYTES NFR BLD AUTO: 0 % (ref 0–2)
INR PPP: 0.93 (ref 0.85–1.19)
KETONES UR STRIP-MCNC: NEGATIVE MG/DL
LACTATE SERPL-SCNC: 1.2 MMOL/L (ref 0.5–2)
LEUKOCYTE ESTERASE UR QL STRIP: NEGATIVE
LIPASE SERPL-CCNC: 14 U/L (ref 11–82)
LYMPHOCYTES # BLD AUTO: 1.75 THOUSANDS/ÂΜL (ref 0.6–4.47)
LYMPHOCYTES NFR BLD AUTO: 12 % (ref 14–44)
MCH RBC QN AUTO: 27 PG (ref 26.8–34.3)
MCHC RBC AUTO-ENTMCNC: 33.8 G/DL (ref 31.4–37.4)
MCV RBC AUTO: 80 FL (ref 82–98)
MONOCYTES # BLD AUTO: 0.76 THOUSAND/ÂΜL (ref 0.17–1.22)
MONOCYTES NFR BLD AUTO: 5 % (ref 4–12)
NEUTROPHILS # BLD AUTO: 12.13 THOUSANDS/ÂΜL (ref 1.85–7.62)
NEUTS SEG NFR BLD AUTO: 82 % (ref 43–75)
NITRITE UR QL STRIP: NEGATIVE
NRBC BLD AUTO-RTO: 0 /100 WBCS
PH UR STRIP.AUTO: 5.5 [PH]
PLATELET # BLD AUTO: 259 THOUSANDS/UL (ref 149–390)
PMV BLD AUTO: 10.5 FL (ref 8.9–12.7)
POTASSIUM SERPL-SCNC: 3.9 MMOL/L (ref 3.5–5.3)
PROT SERPL-MCNC: 7.2 G/DL (ref 6.4–8.4)
PROT UR STRIP-MCNC: NEGATIVE MG/DL
PROTHROMBIN TIME: 12.9 SECONDS (ref 12.3–15)
RBC # BLD AUTO: 6.14 MILLION/UL (ref 3.88–5.62)
SODIUM SERPL-SCNC: 139 MMOL/L (ref 135–147)
SP GR UR STRIP.AUTO: >=1.03 (ref 1–1.03)
UROBILINOGEN UR QL STRIP.AUTO: 0.2 E.U./DL
WBC # BLD AUTO: 14.84 THOUSAND/UL (ref 4.31–10.16)

## 2025-03-15 PROCEDURE — 99285 EMERGENCY DEPT VISIT HI MDM: CPT

## 2025-03-15 PROCEDURE — 85610 PROTHROMBIN TIME: CPT | Performed by: EMERGENCY MEDICINE

## 2025-03-15 PROCEDURE — 36415 COLL VENOUS BLD VENIPUNCTURE: CPT | Performed by: EMERGENCY MEDICINE

## 2025-03-15 PROCEDURE — 96374 THER/PROPH/DIAG INJ IV PUSH: CPT

## 2025-03-15 PROCEDURE — 99285 EMERGENCY DEPT VISIT HI MDM: CPT | Performed by: EMERGENCY MEDICINE

## 2025-03-15 PROCEDURE — 80053 COMPREHEN METABOLIC PANEL: CPT | Performed by: EMERGENCY MEDICINE

## 2025-03-15 PROCEDURE — 85730 THROMBOPLASTIN TIME PARTIAL: CPT | Performed by: EMERGENCY MEDICINE

## 2025-03-15 PROCEDURE — 81003 URINALYSIS AUTO W/O SCOPE: CPT | Performed by: EMERGENCY MEDICINE

## 2025-03-15 PROCEDURE — 85025 COMPLETE CBC W/AUTO DIFF WBC: CPT | Performed by: EMERGENCY MEDICINE

## 2025-03-15 PROCEDURE — 74177 CT ABD & PELVIS W/CONTRAST: CPT

## 2025-03-15 PROCEDURE — 83690 ASSAY OF LIPASE: CPT | Performed by: EMERGENCY MEDICINE

## 2025-03-15 PROCEDURE — 96361 HYDRATE IV INFUSION ADD-ON: CPT

## 2025-03-15 PROCEDURE — 83605 ASSAY OF LACTIC ACID: CPT | Performed by: EMERGENCY MEDICINE

## 2025-03-15 PROCEDURE — 96375 TX/PRO/DX INJ NEW DRUG ADDON: CPT

## 2025-03-15 PROCEDURE — 93005 ELECTROCARDIOGRAM TRACING: CPT

## 2025-03-15 RX ORDER — METRONIDAZOLE 500 MG/1
500 TABLET ORAL ONCE
Status: COMPLETED | OUTPATIENT
Start: 2025-03-15 | End: 2025-03-15

## 2025-03-15 RX ORDER — CEFADROXIL 500 MG/1
500 CAPSULE ORAL EVERY 12 HOURS SCHEDULED
Qty: 14 CAPSULE | Refills: 0 | Status: SHIPPED | OUTPATIENT
Start: 2025-03-15 | End: 2025-03-17

## 2025-03-15 RX ORDER — ONDANSETRON 2 MG/ML
4 INJECTION INTRAMUSCULAR; INTRAVENOUS ONCE
Status: COMPLETED | OUTPATIENT
Start: 2025-03-15 | End: 2025-03-15

## 2025-03-15 RX ORDER — PANTOPRAZOLE SODIUM 40 MG/10ML
40 INJECTION, POWDER, LYOPHILIZED, FOR SOLUTION INTRAVENOUS ONCE
Status: COMPLETED | OUTPATIENT
Start: 2025-03-15 | End: 2025-03-15

## 2025-03-15 RX ORDER — ONDANSETRON 4 MG/1
4 TABLET, ORALLY DISINTEGRATING ORAL EVERY 6 HOURS PRN
Qty: 12 TABLET | Refills: 0 | Status: SHIPPED | OUTPATIENT
Start: 2025-03-15

## 2025-03-15 RX ORDER — METRONIDAZOLE 500 MG/1
500 TABLET ORAL EVERY 12 HOURS SCHEDULED
Qty: 14 TABLET | Refills: 0 | Status: SHIPPED | OUTPATIENT
Start: 2025-03-15 | End: 2025-03-17

## 2025-03-15 RX ADMIN — CEPHALEXIN 500 MG: 250 CAPSULE ORAL at 16:24

## 2025-03-15 RX ADMIN — ONDANSETRON 4 MG: 2 INJECTION, SOLUTION INTRAMUSCULAR; INTRAVENOUS at 14:02

## 2025-03-15 RX ADMIN — SODIUM CHLORIDE 1000 ML: 0.9 INJECTION, SOLUTION INTRAVENOUS at 14:02

## 2025-03-15 RX ADMIN — IOHEXOL 100 ML: 350 INJECTION, SOLUTION INTRAVENOUS at 15:03

## 2025-03-15 RX ADMIN — METRONIDAZOLE 500 MG: 500 TABLET ORAL at 16:24

## 2025-03-15 RX ADMIN — PANTOPRAZOLE SODIUM 40 MG: 40 INJECTION, POWDER, FOR SOLUTION INTRAVENOUS at 14:02

## 2025-03-15 NOTE — TELEPHONE ENCOUNTER
"FOLLOW UP: N/A    REASON FOR CONVERSATION: Black or Bloody Stool    SYMPTOMS: Dx w colitis in ED today; concerned for ongoing blood in stool and cramping    OTHER: Reassured symptoms sound consistent with Dx; take meds as prescribed.  ED precautions reviewed.  Patient verbalized understanding.    DISPOSITION: See PCP Within 3 Days      Reason for Disposition   MILD rectal bleeding (e.g., more than just a few drops or streaks)    Answer Assessment - Initial Assessment Questions  1. APPEARANCE of BLOOD: \"What color is it?\" \"Is it passed separately, on the surface of the stool, or mixed in with the stool?\"         Bright red    2. AMOUNT: \"How much blood was passed?\"         Mixed with stool    3. FREQUENCY: \"How many times has blood been passed with the stools?\"       4-5 episodes    4. ONSET: \"When was the blood first seen in the stools?\" (Days or weeks)         This afternoon; multiple stools with blood    5. DIARRHEA: \"Is there also some diarrhea?\" If Yes, ask: \"How many diarrhea stools in the past 24 hours?\"         Yes    6. CONSTIPATION: \"Do you have constipation?\" If Yes, ask: \"How bad is it?\"        Denies, but has hx of hemorrhoids    7. RECURRENT SYMPTOMS: \"Have you had blood in your stools before?\" If Yes, ask: \"When was the last time?\" and \"What happened that time?\"         \"Not like this\"    8. BLOOD THINNERS: \"Do you take any blood thinners?\" (e.g., Coumadin/warfarin, Pradaxa/dabigatran, aspirin)        Denies    9. OTHER SYMPTOMS: \"Do you have any other symptoms?\"  (e.g., abdomen pain, vomiting, dizziness, fever)        7-8/10 abdominal pain  Denies lightheadedness or dizziness    Protocols used: Rectal Bleeding-Adult-AH    "

## 2025-03-15 NOTE — TELEPHONE ENCOUNTER
"Regarding: Diagnosed with Mild Diffuse Colitis, Blood in Stool just now, Nausea, Abd Pain  ----- Message from Tawanna MATHIAS sent at 3/15/2025  5:37 PM EDT -----  \" I went to the ER today, and I have Mild Diffuse Colitis, Most Prominent in R Colon. I went to the bathroom a half hour ago and there is Blood in my Stool. I also  have Abdominal Pain and Nausea.\"    "

## 2025-03-15 NOTE — ED NOTES
HR increased to 130s, sinus tach, rpt EKG completed. Pt denies complaints.       Amanda Titus, RN  03/15/25 1510

## 2025-03-15 NOTE — ED PROVIDER NOTES
Time reflects when diagnosis was documented in both MDM as applicable and the Disposition within this note       Time User Action Codes Description Comment    3/15/2025  4:20 PM Tyrese Guillaume Add [K52.9] Colitis           ED Disposition       ED Disposition   Discharge    Condition   Stable    Date/Time   Sat Mar 15, 2025  4:20 PM    Comment   Prince Trevino discharge to home/self care.                   Assessment & Plan       Medical Decision Making  Differential diagnosis included but not limited to colitis diverticulitis bowel obstruction gastrointestinal hemorrhage.  Patient is afebrile nontoxic well-appearing clinically and hemodynamically stable in the emergency department workup in the ED reveals findings of acute colitis.  No evidence of gastrointestinal hemorrhage stable hemoglobin and hemodynamics. for now we will recommend antibiotics plenty fluids rest port of care antiemetics as needed and prompt follow-up with primary physician for further evaluation and treatment and obtain test results.  Return precautions and anticipatory guidance discussed.      Problems Addressed:  Colitis: acute illness or injury    Amount and/or Complexity of Data Reviewed  Labs: ordered. Decision-making details documented in ED Course.  Radiology: ordered. Decision-making details documented in ED Course.  ECG/medicine tests: ordered and independent interpretation performed. Decision-making details documented in ED Course.    Risk  Prescription drug management.             Medications   cephalexin (KEFLEX) capsule 500 mg (has no administration in time range)   metroNIDAZOLE (FLAGYL) tablet 500 mg (has no administration in time range)   sodium chloride 0.9 % bolus 1,000 mL (0 mL Intravenous Stopped 3/15/25 1500)   pantoprazole (PROTONIX) injection 40 mg (40 mg Intravenous Given 3/15/25 1402)   ondansetron (ZOFRAN) injection 4 mg (4 mg Intravenous Given 3/15/25 1402)   iohexol (OMNIPAQUE) 350 MG/ML injection (MULTI-DOSE) 100 mL  (100 mL Intravenous Given 3/15/25 1503)       ED Risk Strat Scores                            SBIRT 20yo+      Flowsheet Row Most Recent Value   Initial Alcohol Screen: US AUDIT-C     1. How often do you have a drink containing alcohol? 0 Filed at: 03/15/2025 1342   2. How many drinks containing alcohol do you have on a typical day you are drinking?  0 Filed at: 03/15/2025 1342   3a. Male UNDER 65: How often do you have five or more drinks on one occasion? 0 Filed at: 03/15/2025 1342   Audit-C Score 0 Filed at: 03/15/2025 1342   SUNNI: How many times in the past year have you...    Used an illegal drug or used a prescription medication for non-medical reasons? Never Filed at: 03/15/2025 1342                            History of Present Illness       Chief Complaint   Patient presents with    Diarrhea     Patient presents to the ED with complaints of diarrhea that began this morning. He reports bright red blood in his stools throughout the day. He reports taking pepto x2 with no relief.        Past Medical History:   Diagnosis Date    Peñaloza's esophagus     Chronic sinusitis     Chronic throat clearing     DNS (deviated nasal septum)     Dysphagia     ETD (eustachian tube dysfunction)     GERD (gastroesophageal reflux disease)     Globus sensation     HL (hearing loss)     Hyperlipemia     Neck pain on left side     PND (post-nasal drip)     Rhinitis     Sleep difficulties     snoring      Past Surgical History:   Procedure Laterality Date    APPENDECTOMY      COLONOSCOPY      UPPER GASTROINTESTINAL ENDOSCOPY        Family History   Problem Relation Age of Onset    Heart attack Father     Heart disease Father         Stent blockage      Social History     Tobacco Use    Smoking status: Former     Current packs/day: 0.00     Types: Cigarettes     Quit date: 2022     Years since quitting: 3.2    Smokeless tobacco: Never   Vaping Use    Vaping status: Never Used   Substance Use Topics    Alcohol use: Yes      Alcohol/week: 2.0 standard drinks of alcohol    Drug use: Never      E-Cigarette/Vaping    E-Cigarette Use Never User       E-Cigarette/Vaping Substances    Nicotine No     THC No     CBD No     Flavoring No     Other No     Unknown No       I have reviewed and agree with the history as documented.     Patient is a 49-year-old male history of GERD Peñaloza's esophagus hyperlipidemia presents emergency department complaining of nausea and diarrhea described as watery with streaks of bright red blood mixed in the diarrhea symptoms started this morning associate with left lower abdominal pain and cramping.  No fevers.  Patient reports eating out last night believes possible bad food exposure.        History provided by:  Patient  Diarrhea  Quality:  Watery and blood-tinged  Duration:  1 day  Associated symptoms: abdominal pain    Associated symptoms: no fever and no vomiting        Review of Systems   Constitutional:  Negative for fever.   Respiratory:  Negative for shortness of breath.    Cardiovascular:  Negative for chest pain.   Gastrointestinal:  Positive for abdominal pain, diarrhea and nausea. Negative for vomiting.   Neurological:  Negative for dizziness and light-headedness.   All other systems reviewed and are negative.          Objective       ED Triage Vitals [03/15/25 1340]   Temperature Pulse Blood Pressure Respirations SpO2 Patient Position - Orthostatic VS   97.7 °F (36.5 °C) (!) 116 139/90 18 98 % Sitting      Temp Source Heart Rate Source BP Location FiO2 (%) Pain Score    Temporal Monitor Left arm -- 5      Vitals      Date and Time Temp Pulse SpO2 Resp BP Pain Score FACES Pain Rating User   03/15/25 1545 -- 108 98 % 12 126/84 -- --    03/15/25 1445 -- 104 97 % 12 142/76 -- --    03/15/25 1430 -- 117 96 % 16 131/80 -- --    03/15/25 1415 -- 108 96 % 20 131/86 -- --    03/15/25 1340 97.7 °F (36.5 °C) 116 98 % 18 139/90 5 -- RR            Physical Exam  Vitals and nursing note reviewed.    Constitutional:       General: He is not in acute distress.     Appearance: Normal appearance.   HENT:      Head: Normocephalic and atraumatic.      Nose: Nose normal.   Eyes:      Conjunctiva/sclera: Conjunctivae normal.   Cardiovascular:      Rate and Rhythm: Tachycardia present.   Pulmonary:      Effort: Pulmonary effort is normal. No respiratory distress.   Abdominal:      General: There is no distension.      Palpations: Abdomen is soft.      Tenderness: There is abdominal tenderness in the left lower quadrant. There is no guarding or rebound.   Skin:     General: Skin is dry.   Neurological:      General: No focal deficit present.      Mental Status: He is alert and oriented to person, place, and time.         Results Reviewed       Procedure Component Value Units Date/Time    Comprehensive metabolic panel [[id number]]  (Abnormal) Collected: 03/15/25 1403    Lab Status: Final result Specimen: Blood from Arm, Right Updated: 03/15/25 1434     Sodium 139 mmol/L      Potassium 3.9 mmol/L      Chloride 106 mmol/L      CO2 25 mmol/L      ANION GAP 8 mmol/L      BUN 19 mg/dL      Creatinine 0.96 mg/dL      Glucose 112 mg/dL      Calcium 9.5 mg/dL      AST 25 U/L      ALT 41 U/L      Alkaline Phosphatase 109 U/L      Total Protein 7.2 g/dL      Albumin 4.6 g/dL      Total Bilirubin 0.84 mg/dL      eGFR 92 ml/min/1.73sq m     Narrative:      National Kidney Disease Foundation guidelines for Chronic Kidney Disease (CKD):     Stage 1 with normal or high GFR (GFR > 90 mL/min/1.73 square meters)    Stage 2 Mild CKD (GFR = 60-89 mL/min/1.73 square meters)    Stage 3A Moderate CKD (GFR = 45-59 mL/min/1.73 square meters)    Stage 3B Moderate CKD (GFR = 30-44 mL/min/1.73 square meters)    Stage 4 Severe CKD (GFR = 15-29 mL/min/1.73 square meters)    Stage 5 End Stage CKD (GFR <15 mL/min/1.73 square meters)  Note: GFR calculation is accurate only with a steady state creatinine    Lipase [[id number]]  (Normal) Collected:  03/15/25 1403    Lab Status: Final result Specimen: Blood from Arm, Right Updated: 03/15/25 1434     Lipase 14 u/L     Lactic acid, plasma (w/reflex if result > 2.0) [[id number]]  (Normal) Collected: 03/15/25 1403    Lab Status: Final result Specimen: Blood from Arm, Right Updated: 03/15/25 1434     LACTIC ACID 1.2 mmol/L     Narrative:      Result may be elevated if tourniquet was used during collection.    Protime-INR [[id number]]  (Normal) Collected: 03/15/25 1403    Lab Status: Final result Specimen: Blood from Arm, Right Updated: 03/15/25 1426     Protime 12.9 seconds      INR 0.93    Narrative:      INR Therapeutic Range    Indication                                             INR Range      Atrial Fibrillation                                               2.0-3.0  Hypercoagulable State                                    2.0.2.3  Left Ventricular Asist Device                            2.0-3.0  Mechanical Heart Valve                                  -    Aortic(with afib, MI, embolism, HF, LA enlargement,    and/or coagulopathy)                                     2.0-3.0 (2.5-3.5)     Mitral                                                             2.5-3.5  Prosthetic/Bioprosthetic Heart Valve               2.0-3.0  Venous thromboembolism (VTE: VT, PE        2.0-3.0    APTT [[id number]]  (Normal) Collected: 03/15/25 1403    Lab Status: Final result Specimen: Blood from Arm, Right Updated: 03/15/25 1426     PTT 30 seconds     UA w Reflex to Microscopic w Reflex to Culture [[id number]] Collected: 03/15/25 1403    Lab Status: Final result Specimen: Urine, Clean Catch Updated: 03/15/25 1419     Color, UA Yellow     Clarity, UA Clear     Specific Gravity, UA >=1.030     pH, UA 5.5     Leukocytes, UA Negative     Nitrite, UA Negative     Protein, UA Negative mg/dl      Glucose, UA Negative mg/dl      Ketones, UA Negative mg/dl      Urobilinogen, UA 0.2 E.U./dl      Bilirubin, UA Negative     Occult Blood, UA  Negative    CBC and differential [[id number]]  (Abnormal) Collected: 03/15/25 1403    Lab Status: Final result Specimen: Blood from Arm, Right Updated: 03/15/25 1414     WBC 14.84 Thousand/uL      RBC 6.14 Million/uL      Hemoglobin 16.6 g/dL      Hematocrit 49.1 %      MCV 80 fL      MCH 27.0 pg      MCHC 33.8 g/dL      RDW 13.4 %      MPV 10.5 fL      Platelets 259 Thousands/uL      nRBC 0 /100 WBCs      Segmented % 82 %      Immature Grans % 0 %      Lymphocytes % 12 %      Monocytes % 5 %      Eosinophils Relative 1 %      Basophils Relative 0 %      Absolute Neutrophils 12.13 Thousands/µL      Absolute Immature Grans 0.06 Thousand/uL      Absolute Lymphocytes 1.75 Thousands/µL      Absolute Monocytes 0.76 Thousand/µL      Eosinophils Absolute 0.10 Thousand/µL      Basophils Absolute 0.04 Thousands/µL             CT abdomen pelvis with contrast   Final Interpretation by James Robin MD (03/15 1611)      Mild diffuse colitis, most prominent in the right colon.         Workstation performed: [id number]             ECG 12 Lead Documentation Only    Date/Time: 3/15/2025 2:37 PM    Performed by: Tyrese Guillaume DO  Authorized by: Tyrese Guillaume DO    ECG reviewed by me, the ED Provider: yes    Patient location:  ED  Previous ECG:     Comparison to cardiac monitor: Yes    Quality:     Tracing quality:  Limited by artifact  Rate:     ECG rate:  120    ECG rate assessment: tachycardic    Rhythm:     Rhythm: sinus tachycardia    QRS:     QRS axis:  Normal    QRS intervals:  Normal  Conduction:     Conduction: normal    ST segments:     ST segments:  Normal  T waves:     T waves: normal        ED Medication and Procedure Management   Prior to Admission Medications   Prescriptions Last Dose Informant Patient Reported? Taking?   atorvastatin (LIPITOR) 10 mg tablet  Self Yes No   Sig: Take 10 mg by mouth daily   fluticasone (FLONASE) 50 mcg/act nasal spray   No No   Si spray into each nostril daily   losartan  (COZAAR) 25 mg tablet   Yes No   Sig: Take 25 mg by mouth daily   omeprazole (PriLOSEC) 20 mg delayed release capsule   No No   Sig: Take 1 capsule (20 mg total) by mouth daily before breakfast      Facility-Administered Medications: None     Patient's Medications   Discharge Prescriptions    CEFADROXIL (DURICEF) 500 MG CAPSULE    Take 1 capsule (500 mg total) by mouth every 12 (twelve) hours for 7 days       Start Date: 3/15/2025 End Date: 3/22/2025       Order Dose: 500 mg       Quantity: 14 capsule    Refills: 0    METRONIDAZOLE (FLAGYL) 500 MG TABLET    Take 1 tablet (500 mg total) by mouth every 12 (twelve) hours for 7 days       Start Date: 3/15/2025 End Date: 3/22/2025       Order Dose: 500 mg       Quantity: 14 tablet    Refills: 0    ONDANSETRON (ZOFRAN-ODT) 4 MG DISINTEGRATING TABLET    Take 1 tablet (4 mg total) by mouth every 6 (six) hours as needed for nausea or vomiting       Start Date: 3/15/2025 End Date: --       Order Dose: 4 mg       Quantity: 12 tablet    Refills: 0     No discharge procedures on file.  ED SEPSIS DOCUMENTATION   Time reflects when diagnosis was documented in both MDM as applicable and the Disposition within this note       Time User Action Codes Description Comment    3/15/2025  4:20 PM Tyrese Guillaume Add [K52.9] Colitis                  Tyrese Guillaume,   03/15/25 1622       Tyrese Guillaume,   03/15/25 1622

## 2025-03-16 ENCOUNTER — HOSPITAL ENCOUNTER (OUTPATIENT)
Facility: HOSPITAL | Age: 50
Setting detail: OBSERVATION
Discharge: HOME | End: 2025-03-17
Attending: EMERGENCY MEDICINE | Admitting: STUDENT IN AN ORGANIZED HEALTH CARE EDUCATION/TRAINING PROGRAM
Payer: COMMERCIAL

## 2025-03-16 DIAGNOSIS — K52.9 COLITIS: Primary | ICD-10-CM

## 2025-03-16 DIAGNOSIS — K92.2 GI BLEEDING: ICD-10-CM

## 2025-03-16 DIAGNOSIS — K22.719 BARRETT'S ESOPHAGUS WITH DYSPLASIA: ICD-10-CM

## 2025-03-16 PROBLEM — I25.10 CAD (CORONARY ARTERY DISEASE): Status: ACTIVE | Noted: 2025-03-16

## 2025-03-16 PROBLEM — I10 HYPERTENSION: Status: ACTIVE | Noted: 2025-03-16

## 2025-03-16 PROBLEM — N41.0 ACUTE PROSTATITIS: Status: RESOLVED | Noted: 2024-08-29 | Resolved: 2025-03-16

## 2025-03-16 PROBLEM — R51.9 SINUS HEADACHE: Status: ACTIVE | Noted: 2023-09-28

## 2025-03-16 PROBLEM — R33.9 INCOMPLETE EMPTYING OF BLADDER: Status: RESOLVED | Noted: 2021-08-05 | Resolved: 2025-03-16

## 2025-03-16 PROBLEM — G89.29 CHRONIC MIDLINE LOW BACK PAIN WITHOUT SCIATICA: Status: ACTIVE | Noted: 2019-11-21

## 2025-03-16 PROBLEM — R36.1 HEMATOSPERMIA: Status: RESOLVED | Noted: 2024-08-19 | Resolved: 2025-03-16

## 2025-03-16 PROBLEM — M54.50 CHRONIC MIDLINE LOW BACK PAIN WITHOUT SCIATICA: Status: ACTIVE | Noted: 2019-11-21

## 2025-03-16 PROBLEM — J30.89 ENVIRONMENTAL AND SEASONAL ALLERGIES: Status: ACTIVE | Noted: 2018-08-07

## 2025-03-16 PROBLEM — K63.5 COLON POLYP: Status: ACTIVE | Noted: 2023-09-01

## 2025-03-16 PROBLEM — K22.70 BARRETT ESOPHAGUS: Status: ACTIVE | Noted: 2019-11-21

## 2025-03-16 LAB
ABO GROUP BLD: NORMAL
ALBUMIN SERPL BCG-MCNC: 3.7 G/DL (ref 3.5–5)
ALBUMIN SERPL BCG-MCNC: 4.4 G/DL (ref 3.5–5)
ALP SERPL-CCNC: 79 U/L (ref 34–104)
ALP SERPL-CCNC: 96 U/L (ref 34–104)
ALT SERPL W P-5'-P-CCNC: 25 U/L (ref 7–52)
ALT SERPL W P-5'-P-CCNC: 32 U/L (ref 7–52)
ANION GAP SERPL CALCULATED.3IONS-SCNC: 7 MMOL/L (ref 4–13)
ANION GAP SERPL CALCULATED.3IONS-SCNC: 7 MMOL/L (ref 4–13)
APTT PPP: 29 SECONDS (ref 23–34)
AST SERPL W P-5'-P-CCNC: 18 U/L (ref 13–39)
AST SERPL W P-5'-P-CCNC: 20 U/L (ref 13–39)
BASOPHILS # BLD AUTO: 0.04 THOUSANDS/ÂΜL (ref 0–0.1)
BASOPHILS # BLD MANUAL: 0 THOUSAND/UL (ref 0–0.1)
BASOPHILS NFR BLD AUTO: 0 % (ref 0–1)
BASOPHILS NFR MAR MANUAL: 0 % (ref 0–1)
BILIRUB SERPL-MCNC: 0.89 MG/DL (ref 0.2–1)
BILIRUB SERPL-MCNC: 1.05 MG/DL (ref 0.2–1)
BLD GP AB SCN SERPL QL: NEGATIVE
BUN SERPL-MCNC: 17 MG/DL (ref 5–25)
BUN SERPL-MCNC: 17 MG/DL (ref 5–25)
CALCIUM SERPL-MCNC: 7.9 MG/DL (ref 8.4–10.2)
CALCIUM SERPL-MCNC: 9 MG/DL (ref 8.4–10.2)
CHLORIDE SERPL-SCNC: 105 MMOL/L (ref 96–108)
CHLORIDE SERPL-SCNC: 110 MMOL/L (ref 96–108)
CHOLEST SERPL-MCNC: 138 MG/DL (ref ?–200)
CO2 SERPL-SCNC: 22 MMOL/L (ref 21–32)
CO2 SERPL-SCNC: 27 MMOL/L (ref 21–32)
CREAT SERPL-MCNC: 0.88 MG/DL (ref 0.6–1.3)
CREAT SERPL-MCNC: 1.06 MG/DL (ref 0.6–1.3)
CRP SERPL QL: 18.8 MG/L
CRP SERPL QL: 20.1 MG/L
CRP SERPL QL: 23.1 MG/L
EOSINOPHIL # BLD AUTO: 0.08 THOUSAND/ÂΜL (ref 0–0.61)
EOSINOPHIL # BLD MANUAL: 0.15 THOUSAND/UL (ref 0–0.4)
EOSINOPHIL NFR BLD AUTO: 1 % (ref 0–6)
EOSINOPHIL NFR BLD MANUAL: 1 % (ref 0–6)
ERYTHROCYTE [DISTWIDTH] IN BLOOD BY AUTOMATED COUNT: 13.6 % (ref 11.6–15.1)
ERYTHROCYTE [DISTWIDTH] IN BLOOD BY AUTOMATED COUNT: 13.7 % (ref 11.6–15.1)
ERYTHROCYTE [SEDIMENTATION RATE] IN BLOOD: 3 MM/HOUR (ref 0–14)
EST. AVERAGE GLUCOSE BLD GHB EST-MCNC: 117 MG/DL
FERRITIN SERPL-MCNC: 57 NG/ML (ref 24–336)
GFR SERPL CREATININE-BSD FRML MDRD: 100 ML/MIN/1.73SQ M
GFR SERPL CREATININE-BSD FRML MDRD: 82 ML/MIN/1.73SQ M
GLUCOSE P FAST SERPL-MCNC: 125 MG/DL (ref 65–99)
GLUCOSE SERPL-MCNC: 114 MG/DL (ref 65–140)
GLUCOSE SERPL-MCNC: 125 MG/DL (ref 65–140)
HBA1C MFR BLD: 5.7 %
HCT VFR BLD AUTO: 43.3 % (ref 36.5–49.3)
HCT VFR BLD AUTO: 49.6 % (ref 36.5–49.3)
HDLC SERPL-MCNC: 47 MG/DL
HEMOCCULT STL QL IA: POSITIVE
HGB BLD-MCNC: 14.5 G/DL (ref 12–17)
HGB BLD-MCNC: 16.4 G/DL (ref 12–17)
IMM GRANULOCYTES # BLD AUTO: 0.07 THOUSAND/UL (ref 0–0.2)
IMM GRANULOCYTES NFR BLD AUTO: 0 % (ref 0–2)
INR PPP: 1.03 (ref 0.85–1.19)
IRON SATN MFR SERPL: 15 % (ref 15–50)
IRON SERPL-MCNC: 44 UG/DL (ref 50–212)
LDLC SERPL CALC-MCNC: 78 MG/DL (ref 0–100)
LG PLATELETS BLD QL SMEAR: PRESENT
LYMPHOCYTES # BLD AUTO: 1.33 THOUSAND/UL (ref 0.6–4.47)
LYMPHOCYTES # BLD AUTO: 1.52 THOUSANDS/ÂΜL (ref 0.6–4.47)
LYMPHOCYTES # BLD AUTO: 8 % (ref 14–44)
LYMPHOCYTES NFR BLD AUTO: 10 % (ref 14–44)
MAGNESIUM SERPL-MCNC: 1.9 MG/DL (ref 1.9–2.7)
MCH RBC QN AUTO: 26.9 PG (ref 26.8–34.3)
MCH RBC QN AUTO: 27 PG (ref 26.8–34.3)
MCHC RBC AUTO-ENTMCNC: 33.1 G/DL (ref 31.4–37.4)
MCHC RBC AUTO-ENTMCNC: 33.5 G/DL (ref 31.4–37.4)
MCV RBC AUTO: 81 FL (ref 82–98)
MCV RBC AUTO: 81 FL (ref 82–98)
MONOCYTES # BLD AUTO: 0.3 THOUSAND/UL (ref 0–1.22)
MONOCYTES # BLD AUTO: 1.28 THOUSAND/ÂΜL (ref 0.17–1.22)
MONOCYTES NFR BLD AUTO: 8 % (ref 4–12)
MONOCYTES NFR BLD: 2 % (ref 4–12)
NEUTROPHILS # BLD AUTO: 12.69 THOUSANDS/ÂΜL (ref 1.85–7.62)
NEUTROPHILS # BLD MANUAL: 13.02 THOUSAND/UL (ref 1.85–7.62)
NEUTS BAND NFR BLD MANUAL: 1 % (ref 0–8)
NEUTS SEG NFR BLD AUTO: 81 % (ref 43–75)
NEUTS SEG NFR BLD AUTO: 87 % (ref 43–75)
NONHDLC SERPL-MCNC: 91 MG/DL
NRBC BLD AUTO-RTO: 0 /100 WBCS
PHOSPHATE SERPL-MCNC: 3.7 MG/DL (ref 2.7–4.5)
PLATELET # BLD AUTO: 213 THOUSANDS/UL (ref 149–390)
PLATELET # BLD AUTO: 259 THOUSANDS/UL (ref 149–390)
PLATELET BLD QL SMEAR: ADEQUATE
PMV BLD AUTO: 10.7 FL (ref 8.9–12.7)
PMV BLD AUTO: 11 FL (ref 8.9–12.7)
POTASSIUM SERPL-SCNC: 4.3 MMOL/L (ref 3.5–5.3)
POTASSIUM SERPL-SCNC: 4.3 MMOL/L (ref 3.5–5.3)
PROCALCITONIN SERPL-MCNC: 0.06 NG/ML
PROT SERPL-MCNC: 5.7 G/DL (ref 6.4–8.4)
PROT SERPL-MCNC: 6.8 G/DL (ref 6.4–8.4)
PROTHROMBIN TIME: 13.9 SECONDS (ref 12.3–15)
RBC # BLD AUTO: 5.38 MILLION/UL (ref 3.88–5.62)
RBC # BLD AUTO: 6.09 MILLION/UL (ref 3.88–5.62)
RBC MORPH BLD: NORMAL
RH BLD: NEGATIVE
SODIUM SERPL-SCNC: 139 MMOL/L (ref 135–147)
SODIUM SERPL-SCNC: 139 MMOL/L (ref 135–147)
SPECIMEN EXPIRATION DATE: NORMAL
TIBC SERPL-MCNC: 302.4 UG/DL (ref 250–450)
TRANSFERRIN SERPL-MCNC: 216 MG/DL (ref 203–362)
TRIGL SERPL-MCNC: 67 MG/DL (ref ?–150)
TSH SERPL DL<=0.05 MIU/L-ACNC: 1.01 UIU/ML (ref 0.45–4.5)
UIBC SERPL-MCNC: 258 UG/DL (ref 155–355)
VARIANT LYMPHS # BLD AUTO: 1 %
WBC # BLD AUTO: 14.79 THOUSAND/UL (ref 4.31–10.16)
WBC # BLD AUTO: 15.68 THOUSAND/UL (ref 4.31–10.16)

## 2025-03-16 PROCEDURE — G0328 FECAL BLOOD SCRN IMMUNOASSAY: HCPCS

## 2025-03-16 PROCEDURE — 80053 COMPREHEN METABOLIC PANEL: CPT

## 2025-03-16 PROCEDURE — 87505 NFCT AGENT DETECTION GI: CPT

## 2025-03-16 PROCEDURE — 36415 COLL VENOUS BLD VENIPUNCTURE: CPT | Performed by: EMERGENCY MEDICINE

## 2025-03-16 PROCEDURE — 80053 COMPREHEN METABOLIC PANEL: CPT | Performed by: EMERGENCY MEDICINE

## 2025-03-16 PROCEDURE — 86850 RBC ANTIBODY SCREEN: CPT

## 2025-03-16 PROCEDURE — 84100 ASSAY OF PHOSPHORUS: CPT

## 2025-03-16 PROCEDURE — 85730 THROMBOPLASTIN TIME PARTIAL: CPT

## 2025-03-16 PROCEDURE — 83540 ASSAY OF IRON: CPT

## 2025-03-16 PROCEDURE — 84433 ASY THIOPURIN S-MTHYLTRNSFRS: CPT

## 2025-03-16 PROCEDURE — 80158 DRUG ASSAY CYCLOSPORINE: CPT

## 2025-03-16 PROCEDURE — 85025 COMPLETE CBC W/AUTO DIFF WBC: CPT | Performed by: EMERGENCY MEDICINE

## 2025-03-16 PROCEDURE — 83735 ASSAY OF MAGNESIUM: CPT

## 2025-03-16 PROCEDURE — 99285 EMERGENCY DEPT VISIT HI MDM: CPT | Performed by: EMERGENCY MEDICINE

## 2025-03-16 PROCEDURE — 83036 HEMOGLOBIN GLYCOSYLATED A1C: CPT

## 2025-03-16 PROCEDURE — 85610 PROTHROMBIN TIME: CPT

## 2025-03-16 PROCEDURE — 80061 LIPID PANEL: CPT

## 2025-03-16 PROCEDURE — 83993 ASSAY FOR CALPROTECTIN FECAL: CPT

## 2025-03-16 PROCEDURE — 96375 TX/PRO/DX INJ NEW DRUG ADDON: CPT

## 2025-03-16 PROCEDURE — 84145 PROCALCITONIN (PCT): CPT

## 2025-03-16 PROCEDURE — 85652 RBC SED RATE AUTOMATED: CPT

## 2025-03-16 PROCEDURE — 99284 EMERGENCY DEPT VISIT MOD MDM: CPT

## 2025-03-16 PROCEDURE — 85007 BL SMEAR W/DIFF WBC COUNT: CPT

## 2025-03-16 PROCEDURE — 96374 THER/PROPH/DIAG INJ IV PUSH: CPT

## 2025-03-16 PROCEDURE — 84443 ASSAY THYROID STIM HORMONE: CPT

## 2025-03-16 PROCEDURE — 86901 BLOOD TYPING SEROLOGIC RH(D): CPT

## 2025-03-16 PROCEDURE — 83550 IRON BINDING TEST: CPT

## 2025-03-16 PROCEDURE — 96361 HYDRATE IV INFUSION ADD-ON: CPT

## 2025-03-16 PROCEDURE — 85027 COMPLETE CBC AUTOMATED: CPT

## 2025-03-16 PROCEDURE — 82728 ASSAY OF FERRITIN: CPT

## 2025-03-16 PROCEDURE — 86900 BLOOD TYPING SEROLOGIC ABO: CPT

## 2025-03-16 PROCEDURE — 86140 C-REACTIVE PROTEIN: CPT

## 2025-03-16 PROCEDURE — 99223 1ST HOSP IP/OBS HIGH 75: CPT | Performed by: STUDENT IN AN ORGANIZED HEALTH CARE EDUCATION/TRAINING PROGRAM

## 2025-03-16 RX ORDER — ONDANSETRON 2 MG/ML
4 INJECTION INTRAMUSCULAR; INTRAVENOUS ONCE
Status: COMPLETED | OUTPATIENT
Start: 2025-03-16 | End: 2025-03-16

## 2025-03-16 RX ORDER — OXYCODONE HYDROCHLORIDE 5 MG/1
5 TABLET ORAL EVERY 4 HOURS PRN
Refills: 0 | Status: DISCONTINUED | OUTPATIENT
Start: 2025-03-16 | End: 2025-03-17 | Stop reason: HOSPADM

## 2025-03-16 RX ORDER — SODIUM CHLORIDE 9 MG/ML
125 INJECTION, SOLUTION INTRAVENOUS CONTINUOUS
Status: DISCONTINUED | OUTPATIENT
Start: 2025-03-16 | End: 2025-03-17 | Stop reason: HOSPADM

## 2025-03-16 RX ORDER — ATORVASTATIN CALCIUM 10 MG/1
10 TABLET, FILM COATED ORAL DAILY
Status: DISCONTINUED | OUTPATIENT
Start: 2025-03-16 | End: 2025-03-17 | Stop reason: HOSPADM

## 2025-03-16 RX ORDER — OXYCODONE HYDROCHLORIDE 5 MG/1
2.5 TABLET ORAL EVERY 4 HOURS PRN
Refills: 0 | Status: DISCONTINUED | OUTPATIENT
Start: 2025-03-16 | End: 2025-03-17 | Stop reason: HOSPADM

## 2025-03-16 RX ORDER — METHYLPREDNISOLONE SODIUM SUCCINATE 40 MG/ML
20 INJECTION, POWDER, LYOPHILIZED, FOR SOLUTION INTRAMUSCULAR; INTRAVENOUS EVERY 8 HOURS
Status: DISCONTINUED | OUTPATIENT
Start: 2025-03-16 | End: 2025-03-17

## 2025-03-16 RX ORDER — HYDROMORPHONE HCL IN WATER/PF 6 MG/30 ML
0.2 PATIENT CONTROLLED ANALGESIA SYRINGE INTRAVENOUS EVERY 2 HOUR PRN
Refills: 0 | Status: DISCONTINUED | OUTPATIENT
Start: 2025-03-16 | End: 2025-03-17 | Stop reason: HOSPADM

## 2025-03-16 RX ORDER — ACETAMINOPHEN 325 MG/1
650 TABLET ORAL EVERY 6 HOURS PRN
Status: DISCONTINUED | OUTPATIENT
Start: 2025-03-16 | End: 2025-03-17 | Stop reason: HOSPADM

## 2025-03-16 RX ORDER — METRONIDAZOLE 500 MG/100ML
500 INJECTION, SOLUTION INTRAVENOUS EVERY 8 HOURS
Status: DISCONTINUED | OUTPATIENT
Start: 2025-03-16 | End: 2025-03-17 | Stop reason: HOSPADM

## 2025-03-16 RX ORDER — ONDANSETRON 2 MG/ML
4 INJECTION INTRAMUSCULAR; INTRAVENOUS EVERY 6 HOURS PRN
Status: DISCONTINUED | OUTPATIENT
Start: 2025-03-16 | End: 2025-03-17 | Stop reason: HOSPADM

## 2025-03-16 RX ORDER — PANTOPRAZOLE SODIUM 40 MG/1
40 TABLET, DELAYED RELEASE ORAL
Status: DISCONTINUED | OUTPATIENT
Start: 2025-03-16 | End: 2025-03-17 | Stop reason: HOSPADM

## 2025-03-16 RX ORDER — LOSARTAN POTASSIUM 25 MG/1
25 TABLET ORAL DAILY
Status: DISCONTINUED | OUTPATIENT
Start: 2025-03-16 | End: 2025-03-17 | Stop reason: HOSPADM

## 2025-03-16 RX ORDER — HYDROMORPHONE HCL/PF 1 MG/ML
1 SYRINGE (ML) INJECTION ONCE
Refills: 0 | Status: COMPLETED | OUTPATIENT
Start: 2025-03-16 | End: 2025-03-16

## 2025-03-16 RX ORDER — ENOXAPARIN SODIUM 100 MG/ML
40 INJECTION SUBCUTANEOUS DAILY
Status: DISCONTINUED | OUTPATIENT
Start: 2025-03-16 | End: 2025-03-17 | Stop reason: HOSPADM

## 2025-03-16 RX ADMIN — ATORVASTATIN CALCIUM 10 MG: 10 TABLET, FILM COATED ORAL at 08:28

## 2025-03-16 RX ADMIN — HYDROMORPHONE HYDROCHLORIDE 1 MG: 1 INJECTION, SOLUTION INTRAMUSCULAR; INTRAVENOUS; SUBCUTANEOUS at 01:41

## 2025-03-16 RX ADMIN — METHYLPREDNISOLONE SODIUM SUCCINATE 20 MG: 40 INJECTION, POWDER, FOR SOLUTION INTRAMUSCULAR; INTRAVENOUS at 04:12

## 2025-03-16 RX ADMIN — METHYLPREDNISOLONE SODIUM SUCCINATE 20 MG: 40 INJECTION, POWDER, FOR SOLUTION INTRAMUSCULAR; INTRAVENOUS at 12:05

## 2025-03-16 RX ADMIN — ONDANSETRON 4 MG: 2 INJECTION, SOLUTION INTRAMUSCULAR; INTRAVENOUS at 01:39

## 2025-03-16 RX ADMIN — METRONIDAZOLE 500 MG: 500 INJECTION, SOLUTION INTRAVENOUS at 04:19

## 2025-03-16 RX ADMIN — SODIUM CHLORIDE 125 ML/HR: 0.9 INJECTION, SOLUTION INTRAVENOUS at 14:50

## 2025-03-16 RX ADMIN — SODIUM CHLORIDE 125 ML/HR: 0.9 INJECTION, SOLUTION INTRAVENOUS at 23:59

## 2025-03-16 RX ADMIN — METRONIDAZOLE 500 MG: 500 INJECTION, SOLUTION INTRAVENOUS at 19:46

## 2025-03-16 RX ADMIN — SODIUM CHLORIDE 125 ML/HR: 0.9 INJECTION, SOLUTION INTRAVENOUS at 04:11

## 2025-03-16 RX ADMIN — METRONIDAZOLE 500 MG: 500 INJECTION, SOLUTION INTRAVENOUS at 12:05

## 2025-03-16 RX ADMIN — LOSARTAN POTASSIUM 25 MG: 25 TABLET, FILM COATED ORAL at 08:28

## 2025-03-16 RX ADMIN — ACETAMINOPHEN 650 MG: 325 TABLET ORAL at 15:14

## 2025-03-16 RX ADMIN — SODIUM CHLORIDE 1000 ML: 0.9 INJECTION, SOLUTION INTRAVENOUS at 01:38

## 2025-03-16 RX ADMIN — ACETAMINOPHEN 650 MG: 325 TABLET ORAL at 21:31

## 2025-03-16 RX ADMIN — METHYLPREDNISOLONE SODIUM SUCCINATE 20 MG: 40 INJECTION, POWDER, FOR SOLUTION INTRAMUSCULAR; INTRAVENOUS at 19:46

## 2025-03-16 RX ADMIN — PANTOPRAZOLE SODIUM 40 MG: 40 TABLET, DELAYED RELEASE ORAL at 05:03

## 2025-03-16 NOTE — PLAN OF CARE
Problem: PAIN - ADULT  Goal: Verbalizes/displays adequate comfort level or baseline comfort level  Description: Interventions:  - Encourage patient to monitor pain and request assistance  - Assess pain using appropriate pain scale  - Administer analgesics based on type and severity of pain and evaluate response  - Implement non-pharmacological measures as appropriate and evaluate response  - Consider cultural and social influences on pain and pain management  - Notify physician/advanced practitioner if interventions unsuccessful or patient reports new pain  Outcome: Progressing     Problem: INFECTION - ADULT  Goal: Absence or prevention of progression during hospitalization  Description: INTERVENTIONS:  - Assess and monitor for signs and symptoms of infection  - Monitor lab/diagnostic results  - Monitor all insertion sites, i.e. indwelling lines, tubes, and drains  - Monitor endotracheal if appropriate and nasal secretions for changes in amount and color  - [place] appropriate cooling/warming therapies per order  - Administer medications as ordered  - Instruct and encourage patient and family to use good hand hygiene technique  - Identify and instruct in appropriate isolation precautions for identified infection/condition  Outcome: Progressing     Problem: DISCHARGE PLANNING  Goal: Discharge to home or other facility with appropriate resources  Description: INTERVENTIONS:  - Identify barriers to discharge w/patient and caregiver  - Arrange for needed discharge resources and transportation as appropriate  - Identify discharge learning needs (meds, wound care, etc.)  - Arrange for interpretive services to assist at discharge as needed  - Refer to Case Management Department for coordinating discharge planning if the patient needs post-hospital services based on physician/advanced practitioner order or complex needs related to functional status, cognitive ability, or social support system  Outcome: Progressing      Problem: Knowledge Deficit  Goal: Patient/family/caregiver demonstrates understanding of disease process, treatment plan, medications, and discharge instructions  Description: Complete learning assessment and assess knowledge base.  Interventions:  - Provide teaching at level of understanding  - Provide teaching via preferred learning methods  Outcome: Progressing     Problem: GASTROINTESTINAL - ADULT  Goal: Minimal or absence of nausea and/or vomiting  Description: INTERVENTIONS:  - Administer IV fluids if ordered to ensure adequate hydration  - Maintain NPO status until nausea and vomiting are resolved  - Nasogastric tube if ordered  - Administer ordered antiemetic medications as needed  - Provide nonpharmacologic comfort measures as appropriate  - Advance diet as tolerated, if ordered  - Consider nutrition services referral to assist patient with adequate nutrition and appropriate food choices  Outcome: Progressing  Goal: Maintains or returns to baseline bowel function  Description: INTERVENTIONS:  - Assess bowel function  - Encourage oral fluids to ensure adequate hydration  - Administer IV fluids if ordered to ensure adequate hydration  - Administer ordered medications as needed  - Encourage mobilization and activity  - Consider nutritional services referral to assist patient with adequate nutrition and appropriate food choices  Outcome: Progressing  Goal: Maintains adequate nutritional intake  Description: INTERVENTIONS:  - Monitor percentage of each meal consumed  - Identify factors contributing to decreased intake, treat as appropriate  - Assist with meals as needed  - Monitor I&O, weight, and lab values if indicated  - Obtain nutrition services referral as needed  Outcome: Progressing     Problem: METABOLIC, FLUID AND ELECTROLYTES - ADULT  Goal: Electrolytes maintained within normal limits  Description: INTERVENTIONS:  - Monitor labs and assess patient for signs and symptoms of electrolyte imbalances  -  Administer electrolyte replacement as ordered  - Monitor response to electrolyte replacements, including repeat lab results as appropriate  - Instruct patient on fluid and nutrition as appropriate  Outcome: Progressing

## 2025-03-16 NOTE — ASSESSMENT & PLAN NOTE
Lab Results   Component Value Date    HGBA1C 5.8 (H) 09/05/2024    HGBA1C 5.5 09/01/2023    HGBA1C 5.3 03/11/2021   Patient's hemoglobin A1c noted to be 5.8  Hemoglobin A1c pending  Patient currently not on any antidiabetic medication  Recommend dietary and lifestyle changes

## 2025-03-16 NOTE — LETTER
[facility]  [address] [address]  Dept: [phone]    March 17, 2025     Patient: Prince Trevino   YOB: 1975   Date of Visit: 3/16/2025       To Whom it May Concern:    Prince Trevino is under my professional care. He was seen in the hospital from 3/16/2025 to 03/17/25. He may return to work on 03/20/25 without limitations.    If you have any questions or concerns, please don't hesitate to call.         Sincerely,          Chelsea Mcknight MD

## 2025-03-16 NOTE — H&P
"H&P - Hospitalist   Name: Prince Trevino 49 y.o. male I MRN: [id number]  Unit/Bed#: -01 I Date of Admission: 3/16/2025   Date of Service: 3/16/2025 I Hospital Day: 0     Assessment & Plan  Colitis  Patient presented to the ED for evaluation of abdominal pain, nausea, and bloody stools.  He was previously he was seen earlier on 3/15/2025 for diarrhea and discharged home with colitis and was given me ABX metronidazole.  CT abdomen pelvis: \"Mild diffuse colitis, most prominent in the right colon. No evidence of diverticulosis or bowel obstruction.\"   Patient did not have any bloody stools during his first ER visit.  Patient states that once he was discharged home, he began having multiple episodes of bloody stools and severe abdominal pain.  Previous hemoglobin with first ER visit was 16.6 and repeat hemoglobin during current ER visit was 16.4.  Did not meet sepsis criteria  No fevers, UA negative, lactic acid normal, procalcitonin normal  WBC elevated from 14.84 -> 15.68, likely inflammatory  Received Dilaudid 1 mg IV in the ED for pain  Continue IV metronidazole  Start Solu-Medrol 20 mg IV  Received Zofran in the ED, continue  Received 1 L NS bolus in the ED, continue NS IVF at 125 mL/hr  Start clear liquid diet, but if nausea and vomiting persists will change to n.p.o.  Pain management  C-reactive protein elevated at 18.8, trend  C. 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  Continue home omeprazole  Rectal bleeding  Noted Hx of rectal bleeding  CAD (coronary artery disease)  Noted Hx of nonocclusive CAD  Family history of CAD and HLD  Patient denies any chest pain currently  Esophageal reflux  Continue home omeprazole  Hypercholesterolemia  Continue home atorvastatin  Impaired fasting glucose  Lab Results   Component Value Date    HGBA1C 5.8 (H) 09/05/2024    HGBA1C 5.5 09/01/2023    HGBA1C 5.3 03/11/2021    " Patient's hemoglobin A1c noted to be 5.8  Hemoglobin A1c pending  Patient currently not on any antidiabetic medication  Recommend dietary and lifestyle changes  Hypertension  BP while in the ED is 149/90, current BP on the floor 116/81  Continue home losartan  Monitor BP    VTE Pharmacologic Prophylaxis: VTE Score: 4 Moderate Risk (Score 3-4) - Pharmacological DVT Prophylaxis Contraindicated. Sequential Compression Devices Ordered.  Code Status: Level 1 - Full Code Per patient  Discussion with family: Patient declined call to .     Anticipated Length of Stay: Patient will be admitted on an observation basis with an anticipated length of stay of less than 2 midnights secondary to colitis management.    History of Present Illness   Chief Complaint: Abdominal pain, nausea, and bloody stool    Prince Trevino is a 49 y.o. male with a PMH of Peñaloza's esophagus, chronic sinusitis, dysphagia, GERD, hyperlipidemia who presents with abdominal pain, nausea, and bloody stools.  Patient reports waking up on 3/15/2025 at 5:30 am with bad cramps, liquidy diarrhea, and small bloody stools. Patient presented to the ED for evaluation of abdominal pain and bloody stools.  He was previously he was seen earlier on 3/15/2025 for diarrhea and discharged home with colitis and was given me ABX metronidazole. CT abdomen pelvis showed mild diffuse colitis, most prominent in the right colon. No evidence of diverticulosis or bowel obstruction. Patient did not have any bloody stools during his first ER visit. Patient states that once he was discharged home, he began having multiple episodes of bloody stools and severe abdominal pain. Previous hemoglobin with first ER visit was 16.6 and repeat hemoglobin during current ER visit was 16.4.  Patient reports that after being discharged home he had bloody BMs every 15-20 minutes, and with each bowel movement there was more blood in his stools.  Patient reports diffuse abdominal pain  5-6/10 and nausea.  Patient reports he was recently treated for sinus infection with amoxicillin and noticed that he started having diarrhea afterwards.  He reports history of diarrhea with ABX Augmentin.    Review of Systems   Constitutional:  Positive for appetite change. Negative for chills and fever.   HENT:  Positive for congestion and rhinorrhea. Negative for ear pain and sore throat.    Eyes:  Negative for pain and visual disturbance.   Respiratory:  Negative for cough, chest tightness and shortness of breath.    Cardiovascular:  Negative for chest pain and palpitations.   Gastrointestinal:  Positive for abdominal pain, blood in stool, diarrhea and nausea. Negative for vomiting.   Endocrine: Negative.    Genitourinary:  Negative for dysuria and hematuria.   Musculoskeletal:  Negative for arthralgias and back pain.   Skin:  Negative for color change and rash.   Allergic/Immunologic: Negative.    Neurological:  Negative for dizziness, seizures, syncope, light-headedness and numbness.   Hematological: Negative.    Psychiatric/Behavioral: Negative.     All other systems reviewed and are negative.    Historical Information   Past Medical History:   Diagnosis Date    Peñaloza's esophagus     Chronic sinusitis     Chronic throat clearing     DNS (deviated nasal septum)     Dysphagia     ETD (eustachian tube dysfunction)     GERD (gastroesophageal reflux disease)     Globus sensation     Hyperlipemia     Neck pain on left side     PND (post-nasal drip)     Rhinitis     Sleep difficulties     snoring     Past Surgical History:   Procedure Laterality Date    APPENDECTOMY      COLONOSCOPY      UPPER GASTROINTESTINAL ENDOSCOPY       Social History     Tobacco Use    Smoking status: Former     Current packs/day: 1.50     Average packs/day: 1.5 packs/day for 3.2 years (4.8 ttl pk-yrs)     Types: Cigarettes     Start date: 2022    Smokeless tobacco: Never   Vaping Use    Vaping status: Never Used   Substance and Sexual  Activity    Alcohol use: Yes     Alcohol/week: 2.0 standard drinks of alcohol     Types: 2 Glasses of wine per week    Drug use: Never    Sexual activity: Not on file     E-Cigarette/Vaping    E-Cigarette Use Never User      E-Cigarette/Vaping Substances    Nicotine No     THC No     CBD No     Flavoring No     Other No     Unknown No      Family History   Problem Relation Age of Onset    Hyperlipidemia Mother     Heart attack Father     Heart disease Father         Stent blockage    No Known Problems Sister     No Known Problems Brother     Hyperlipidemia Daughter     Eczema Daughter      Social History:  Marital Status: /Civil Union   Occupation: Working  Patient Pre-hospital Living Situation: Home  Patient Pre-hospital Level of Mobility: walks  Patient Pre-hospital Diet Restrictions: None    Meds/Allergies   I have reviewed home medications with patient personally.  Prior to Admission medications    Medication Sig Start Date End Date Taking? Authorizing Provider   atorvastatin (LIPITOR) 10 mg tablet Take 10 mg by mouth daily    Historical Provider, MD   cefadroxil (DURICEF) 500 mg capsule Take 1 capsule (500 mg total) by mouth every 12 (twelve) hours for 7 days 3/15/25 3/22/25  Tyrese Guillaume DO   fluticasone (FLONASE) 50 mcg/act nasal spray 1 spray into each nostril daily 2/20/25   Adráin Shultz PA-C   losartan (COZAAR) 25 mg tablet Take 25 mg by mouth daily 12/6/24   Historical Provider, MD   metroNIDAZOLE (FLAGYL) 500 mg tablet Take 1 tablet (500 mg total) by mouth every 12 (twelve) hours for 7 days 3/15/25 3/22/25  Tyrese Guillaume DO   omeprazole (PriLOSEC) 20 mg delayed release capsule Take 1 capsule (20 mg total) by mouth daily before breakfast 11/13/24 11/13/25  Manisha Romano MD   ondansetron (ZOFRAN-ODT) 4 mg disintegrating tablet Take 1 tablet (4 mg total) by mouth every 6 (six) hours as needed for nausea or vomiting 3/15/25   Tyrese Guillaume DO     No Known Allergies    Objective :  Temp:  [96.6  °F (35.9 °C)-97.1 °F (36.2 °C)] 96.6 °F (35.9 °C)  HR:  [115-118] 115  BP: (116-149)/(81-90) 116/81  Resp:  [17] 17  SpO2:  [98 %] 98 %    Physical Exam  Constitutional:       General: He is not in acute distress.     Appearance: He is not ill-appearing.   Cardiovascular:      Rate and Rhythm: Normal rate and regular rhythm.      Pulses: Normal pulses.      Heart sounds: Normal heart sounds. No murmur heard.  Pulmonary:      Effort: Pulmonary effort is normal. No respiratory distress.      Breath sounds: Normal breath sounds. No wheezing or rales.   Abdominal:      General: Bowel sounds are absent. There is no distension.      Palpations: Abdomen is soft.      Tenderness: There is generalized abdominal tenderness.      Comments: L > R   Musculoskeletal:         General: No swelling or tenderness.   Skin:     General: Skin is warm and dry.      Findings: No erythema or rash.   Neurological:      General: No focal deficit present.      Mental Status: He is alert and oriented to person, place, and time. Mental status is at baseline.   Psychiatric:         Mood and Affect: Mood normal.         Behavior: Behavior normal.         Thought Content: Thought content normal.         Judgment: Judgment normal.        Lines/Drains:      Lab Results: I have reviewed the following results:  Results from last 7 days   Lab Units 03/16/25  0128   WBC Thousand/uL 15.68*   HEMOGLOBIN g/dL 16.4   HEMATOCRIT % 49.6*   PLATELETS Thousands/uL 259   SEGS PCT % 81*   LYMPHO PCT % 10*   MONO PCT % 8   EOS PCT % 1     Results from last 7 days   Lab Units 03/16/25  0128   SODIUM mmol/L 139   POTASSIUM mmol/L 4.3   CHLORIDE mmol/L 105   CO2 mmol/L 27   BUN mg/dL 17   CREATININE mg/dL 1.06   ANION GAP mmol/L 7   CALCIUM mg/dL 9.0   ALBUMIN g/dL 4.4   TOTAL BILIRUBIN mg/dL 1.05*   ALK PHOS U/L 96   ALT U/L 32   AST U/L 18   GLUCOSE RANDOM mg/dL 114     Results from last 7 days   Lab Units 03/15/25  1403   INR  0.93     Lab Results   Component  Value Date    HGBA1C 5.8 (H) 09/05/2024    HGBA1C 5.5 09/01/2023    HGBA1C 5.3 03/11/2021     Results from last 7 days   Lab Units 03/16/25  0128 03/15/25  1403   LACTIC ACID mmol/L  --  1.2   PROCALCITONIN ng/ml 0.06  --      Imaging Results Review: I reviewed radiology reports from this admission including: CT abdomen/pelvis.  Other Study Results Review: EKG was reviewed.     Administrative Statements     ** Please Note: This note has been constructed using a voice recognition system. **

## 2025-03-16 NOTE — ED PROVIDER NOTES
Time reflects when diagnosis was documented in both MDM as applicable and the Disposition within this note       Time User Action Codes Description Comment    3/16/2025  2:32 AM Elisha Salinas [K52.9] Colitis     3/16/2025  2:32 AM Elisha Salinas [K92.2] GI bleeding           ED Disposition       ED Disposition   Admit    Condition   Stable    Date/Time   Sun Mar 16, 2025  2:32 AM    Comment   --             Assessment & Plan       Medical Decision Making  Differential diagnosis includes but not limited to: Dehydration, electrolyte abnormality, GI bleed, colitis, anemia    Amount and/or Complexity of Data Reviewed  Labs: ordered.    Risk  Prescription drug management.  Decision regarding hospitalization.        ED Course as of 03/16/25 0235   Sun Mar 16, 2025   0234 Case was discussed with the hospitalist who agrees to place the patient on observation.       Medications   sodium chloride 0.9 % bolus 1,000 mL (1,000 mL Intravenous New Bag 3/16/25 0138)   ondansetron (ZOFRAN) injection 4 mg (4 mg Intravenous Given 3/16/25 0139)   HYDROmorphone (DILAUDID) injection 1 mg (1 mg Intravenous Given 3/16/25 0141)       ED Risk Strat Scores                                                History of Present Illness       Chief Complaint   Patient presents with    Nausea     Nausea and diarrhea all day today. Pt seen yesterday for the same & dx with colitis. Pt unable to fill scripts. Pt unable to eat.        Past Medical History:   Diagnosis Date    Peñaloza's esophagus     Chronic sinusitis     Chronic throat clearing     DNS (deviated nasal septum)     Dysphagia     ETD (eustachian tube dysfunction)     GERD (gastroesophageal reflux disease)     Globus sensation     HL (hearing loss)     Hyperlipemia     Neck pain on left side     PND (post-nasal drip)     Rhinitis     Sleep difficulties     snoring      Past Surgical History:   Procedure Laterality Date    APPENDECTOMY      COLONOSCOPY      UPPER GASTROINTESTINAL  ENDOSCOPY        Family History   Problem Relation Age of Onset    Heart attack Father     Heart disease Father         Stent blockage      Social History     Tobacco Use    Smoking status: Former     Current packs/day: 0.00     Types: Cigarettes     Quit date: 2022     Years since quitting: 3.2    Smokeless tobacco: Never   Vaping Use    Vaping status: Never Used   Substance Use Topics    Alcohol use: Yes     Alcohol/week: 2.0 standard drinks of alcohol    Drug use: Never      E-Cigarette/Vaping    E-Cigarette Use Never User       E-Cigarette/Vaping Substances    Nicotine No     THC No     CBD No     Flavoring No     Other No     Unknown No       I have reviewed and agree with the history as documented.     This is a 49-year-old male who presented to the ED for evaluation of abdominal pain with nausea and diarrhea.  Patient was seen yesterday for the same complaint and he was diagnosed with mild colitis.  Patient states that he went home and then began to have worsening pain as well as multiple bloody stools.  He is unable to keep anything down.        Review of Systems   Constitutional:  Negative for chills and fever.   HENT:  Negative for ear pain and sore throat.    Eyes:  Negative for pain and visual disturbance.   Respiratory:  Negative for cough and shortness of breath.    Cardiovascular:  Negative for chest pain and palpitations.   Gastrointestinal:  Positive for abdominal pain, blood in stool and nausea. Negative for vomiting.   Genitourinary:  Negative for dysuria and hematuria.   Musculoskeletal:  Negative for arthralgias and back pain.   Skin:  Negative for color change and rash.   Neurological:  Negative for seizures and syncope.   All other systems reviewed and are negative.          Objective       ED Triage Vitals   Temperature Pulse Blood Pressure Respirations SpO2 Patient Position - Orthostatic VS   03/16/25 0009 03/16/25 0009 03/16/25 0009 03/16/25 0009 03/16/25 0011 03/16/25 0009   (!) 97.1 °F  (36.2 °C) (!) 118 149/90 17 98 % Lying      Temp Source Heart Rate Source BP Location FiO2 (%) Pain Score    03/16/25 0009 -- 03/16/25 0009 -- 03/16/25 0141    Temporal  Right arm  8      Vitals      Date and Time Temp Pulse SpO2 Resp BP Pain Score FACES Pain Rating User   03/16/25 0141 -- -- -- -- -- 8 -- JM   03/16/25 0011 -- 115 98 % -- -- -- -- RG   03/16/25 0009 97.1 °F (36.2 °C) 118 -- 17 149/90 -- -- RG            Physical Exam  Vitals and nursing note reviewed.   Constitutional:       General: He is in acute distress.      Appearance: Normal appearance. He is well-developed and normal weight.   HENT:      Head: Normocephalic and atraumatic.      Right Ear: External ear normal.      Left Ear: External ear normal.      Nose: Nose normal.      Mouth/Throat:      Mouth: Mucous membranes are moist.   Eyes:      Extraocular Movements: Extraocular movements intact.      Conjunctiva/sclera: Conjunctivae normal.   Cardiovascular:      Rate and Rhythm: Normal rate and regular rhythm.      Heart sounds: No murmur heard.  Pulmonary:      Effort: Pulmonary effort is normal. No respiratory distress.      Breath sounds: Normal breath sounds.   Abdominal:      General: Abdomen is flat. Bowel sounds are normal.      Palpations: Abdomen is soft.      Tenderness: There is abdominal tenderness. There is guarding. There is no left CVA tenderness or rebound.      Hernia: No hernia is present.      Comments: Diffuse tenderness with guarding   Musculoskeletal:         General: No swelling, tenderness, deformity or signs of injury. Normal range of motion.      Cervical back: Normal range of motion and neck supple. No rigidity.   Skin:     General: Skin is warm and dry.      Capillary Refill: Capillary refill takes less than 2 seconds.   Neurological:      General: No focal deficit present.      Mental Status: He is alert and oriented to person, place, and time. Mental status is at baseline.      Cranial Nerves: No cranial nerve  deficit.      Sensory: No sensory deficit.      Motor: No weakness.      Coordination: Coordination normal.      Gait: Gait normal.      Deep Tendon Reflexes: Reflexes normal.   Psychiatric:         Mood and Affect: Mood normal.         Results Reviewed       Procedure Component Value Units Date/Time    Comprehensive metabolic panel [[id number]]  (Abnormal) Collected: 03/16/25 0128    Lab Status: Final result Specimen: Blood from Arm, Right Updated: 03/16/25 0217     Sodium 139 mmol/L      Potassium 4.3 mmol/L      Chloride 105 mmol/L      CO2 27 mmol/L      ANION GAP 7 mmol/L      BUN 17 mg/dL      Creatinine 1.06 mg/dL      Glucose 114 mg/dL      Calcium 9.0 mg/dL      AST 18 U/L      ALT 32 U/L      Alkaline Phosphatase 96 U/L      Total Protein 6.8 g/dL      Albumin 4.4 g/dL      Total Bilirubin 1.05 mg/dL      eGFR 82 ml/min/1.73sq m     Narrative:      [employer] guidelines for Chronic Kidney Disease (CKD):     Stage 1 with normal or high GFR (GFR > 90 mL/min/1.73 square meters)    Stage 2 Mild CKD (GFR = 60-89 mL/min/1.73 square meters)    Stage 3A Moderate CKD (GFR = 45-59 mL/min/1.73 square meters)    Stage 3B Moderate CKD (GFR = 30-44 mL/min/1.73 square meters)    Stage 4 Severe CKD (GFR = 15-29 mL/min/1.73 square meters)    Stage 5 End Stage CKD (GFR <15 mL/min/1.73 square meters)  Note: GFR calculation is accurate only with a steady state creatinine    CBC and differential [[id number]]  (Abnormal) Collected: 03/16/25 0128    Lab Status: Final result Specimen: Blood from Arm, Right Updated: 03/16/25 0157     WBC 15.68 Thousand/uL      RBC 6.09 Million/uL      Hemoglobin 16.4 g/dL      Hematocrit 49.6 %      MCV 81 fL      MCH 26.9 pg      MCHC 33.1 g/dL      RDW 13.6 %      MPV 11.0 fL      Platelets 259 Thousands/uL      nRBC 0 /100 WBCs      Segmented % 81 %      Immature Grans % 0 %      Lymphocytes % 10 %      Monocytes % 8 %      Eosinophils Relative 1 %      Basophils Relative  0 %      Absolute Neutrophils 12.69 Thousands/µL      Absolute Immature Grans 0.07 Thousand/uL      Absolute Lymphocytes 1.52 Thousands/µL      Absolute Monocytes 1.28 Thousand/µL      Eosinophils Absolute 0.08 Thousand/µL      Basophils Absolute 0.04 Thousands/µL             No orders to display       Procedures    ED Medication and Procedure Management   Prior to Admission Medications   Prescriptions Last Dose Informant Patient Reported? Taking?   atorvastatin (LIPITOR) 10 mg tablet  Self Yes No   Sig: Take 10 mg by mouth daily   cefadroxil (DURICEF) 500 mg capsule   No No   Sig: Take 1 capsule (500 mg total) by mouth every 12 (twelve) hours for 7 days   fluticasone (FLONASE) 50 mcg/act nasal spray   No No   Si spray into each nostril daily   losartan (COZAAR) 25 mg tablet   Yes No   Sig: Take 25 mg by mouth daily   metroNIDAZOLE (FLAGYL) 500 mg tablet   No No   Sig: Take 1 tablet (500 mg total) by mouth every 12 (twelve) hours for 7 days   omeprazole (PriLOSEC) 20 mg delayed release capsule   No No   Sig: Take 1 capsule (20 mg total) by mouth daily before breakfast   ondansetron (ZOFRAN-ODT) 4 mg disintegrating tablet   No No   Sig: Take 1 tablet (4 mg total) by mouth every 6 (six) hours as needed for nausea or vomiting      Facility-Administered Medications: None     Patient's Medications   Discharge Prescriptions    No medications on file     No discharge procedures on file.  ED SEPSIS DOCUMENTATION   Time reflects when diagnosis was documented in both MDM as applicable and the Disposition within this note       Time User Action Codes Description Comment    3/16/2025  2:32 AM Elisha Salinas [K52.9] Colitis     3/16/2025  2:32 AM Elisha Salinas [K92.2] GI bleeding                  Elisha Salinas,   25 0235

## 2025-03-16 NOTE — ASSESSMENT & PLAN NOTE
BP while in the ED is 149/90, current BP on the floor 116/81  Continue home losartan  Monitor BP   PROGRESS NOTE     Subjective     Luis Eduardo is a 89 year old here for Medication Management and Convey Results (Lipid labs on 8/11/23)   PATIENT PRESENTS TO CLINIC TODAY FOR FOLLOW-UP ON HYPERLIPIDEMIA.  Patient is in his mid 80s he is on a statin.  He is had excellent cholesterol panel for 5 or more years.  We will be discontinuing the statin.  He is without any current side effects or any problems with a statin.  I am discontinuing atorvastatin because of patient's age and increased risk of myopathy with increasing age.  Patient will have close follow-up in approximately 2 months for recheck of his cholesterol off medication.  He also has history of pre diabetes most recent fasting glucose was normal.  Previous hemoglobin A1c was normal.  Review of Systems  All other ROS are negative other than what is listed in this Dictation.      Social Determinants Former Smoker     Objective   Vitals:    08/18/23 1053   BP: 124/76   Pulse: 72   Resp: 14   SpO2: 96%   Weight: 69.5 kg (153 lb 3.2 oz)   Height: 5' 6\" (1.676 m)     Physical Exam  Physical Examination:    Visit Vitals  /76   Pulse 72   Resp 14   Ht 5' 6\" (1.676 m)   Wt 69.5 kg (153 lb 3.2 oz)   SpO2 96%   BMI 24.73 kg/m²     General:  Well developed, well nourished.  Skin:  Warm and dry without rash.    Head:  Normocephalic-atraumatic.   Neck:  Trachea is midline.   No Thyromegaly. No carotid bruits  Eyes:  Normal conjunctivae.     EENT:  No JVD,  speech pattern normal. Pt with decreased hearing chronic and stable, bilateral hearing aides are in place.  Cardiovascular:  Symmetrical pulses with normal peripheral perfusion. S1 S2 normal. RRR. No MRG.  Respiratory:  Normal respiratory effort. Lungs CTA, No wheezing.   Gastrointestinal:  Non-distended.  Soft. No rebound. No guarding. Bowel sounds active. No bruits. No palpable masses.  Musculoskeletal:  No deformity or edema.   Neurologic:  Oriented x4.  No focal deficits.       ASSESSMENT AND PLAN       1.  Hypertension goal BP (blood pressure) < 130/80  2. Hyperlipidemia, mixed  Other orders  -     amLODIPine (NORVASC) 5 MG tablet; Take 1 tablet by mouth daily.      Follow Up  Stop atorvastatin.  Follow-up in approximately 2 months recheck lipid profile prior to visit.  Will also obtain hemoglobin A1c for prediabetes.

## 2025-03-16 NOTE — ASSESSMENT & PLAN NOTE
Noted Hx of nonocclusive CAD  Family history of CAD and HLD  Patient denies any chest pain currently

## 2025-03-17 VITALS
TEMPERATURE: 97.3 F | SYSTOLIC BLOOD PRESSURE: 106 MMHG | HEIGHT: 66 IN | HEART RATE: 104 BPM | OXYGEN SATURATION: 96 % | BODY MASS INDEX: 28.22 KG/M2 | DIASTOLIC BLOOD PRESSURE: 69 MMHG | WEIGHT: 175.6 LBS | RESPIRATION RATE: 19 BRPM

## 2025-03-17 LAB
ANION GAP SERPL CALCULATED.3IONS-SCNC: 6 MMOL/L (ref 4–13)
ATRIAL RATE: 120 BPM
BUN SERPL-MCNC: 10 MG/DL (ref 5–25)
C COLI+JEJUNI TUF STL QL NAA+PROBE: NEGATIVE
C DIFF TOX GENS STL QL NAA+PROBE: NEGATIVE
CALCIUM SERPL-MCNC: 8.4 MG/DL (ref 8.4–10.2)
CALPROTECTIN STL-MCNC: 373 ÂΜG/G
CHLORIDE SERPL-SCNC: 111 MMOL/L (ref 96–108)
CO2 SERPL-SCNC: 26 MMOL/L (ref 21–32)
CREAT SERPL-MCNC: 0.69 MG/DL (ref 0.6–1.3)
EC STX1+STX2 GENES STL QL NAA+PROBE: NEGATIVE
ERYTHROCYTE [DISTWIDTH] IN BLOOD BY AUTOMATED COUNT: 13.7 % (ref 11.6–15.1)
GFR SERPL CREATININE-BSD FRML MDRD: 111 ML/MIN/1.73SQ M
GLUCOSE P FAST SERPL-MCNC: 134 MG/DL (ref 65–99)
GLUCOSE SERPL-MCNC: 134 MG/DL (ref 65–140)
HCT VFR BLD AUTO: 40.8 % (ref 36.5–49.3)
HGB BLD-MCNC: 13.7 G/DL (ref 12–17)
MAGNESIUM SERPL-MCNC: 1.9 MG/DL (ref 1.9–2.7)
MCH RBC QN AUTO: 27.4 PG (ref 26.8–34.3)
MCHC RBC AUTO-ENTMCNC: 33.6 G/DL (ref 31.4–37.4)
MCV RBC AUTO: 82 FL (ref 82–98)
P AXIS: 70 DEGREES
PLATELET # BLD AUTO: 223 THOUSANDS/UL (ref 149–390)
PMV BLD AUTO: 11 FL (ref 8.9–12.7)
POTASSIUM SERPL-SCNC: 3.7 MMOL/L (ref 3.5–5.3)
PR INTERVAL: 138 MS
QRS AXIS: 37 DEGREES
QRSD INTERVAL: 72 MS
QT INTERVAL: 318 MS
QTC INTERVAL: 449 MS
RBC # BLD AUTO: 5 MILLION/UL (ref 3.88–5.62)
SALMONELLA SP SPAO STL QL NAA+PROBE: NEGATIVE
SHIGELLA SP+EIEC IPAH STL QL NAA+PROBE: NEGATIVE
SODIUM SERPL-SCNC: 143 MMOL/L (ref 135–147)
T WAVE AXIS: 36 DEGREES
VENTRICULAR RATE: 120 BPM
WBC # BLD AUTO: 15.91 THOUSAND/UL (ref 4.31–10.16)

## 2025-03-17 PROCEDURE — 83735 ASSAY OF MAGNESIUM: CPT | Performed by: STUDENT IN AN ORGANIZED HEALTH CARE EDUCATION/TRAINING PROGRAM

## 2025-03-17 PROCEDURE — 80048 BASIC METABOLIC PNL TOTAL CA: CPT | Performed by: STUDENT IN AN ORGANIZED HEALTH CARE EDUCATION/TRAINING PROGRAM

## 2025-03-17 PROCEDURE — 85027 COMPLETE CBC AUTOMATED: CPT | Performed by: STUDENT IN AN ORGANIZED HEALTH CARE EDUCATION/TRAINING PROGRAM

## 2025-03-17 PROCEDURE — 99239 HOSP IP/OBS DSCHRG MGMT >30: CPT | Performed by: STUDENT IN AN ORGANIZED HEALTH CARE EDUCATION/TRAINING PROGRAM

## 2025-03-17 RX ORDER — METRONIDAZOLE 500 MG/1
500 TABLET ORAL EVERY 8 HOURS SCHEDULED
Qty: 9 TABLET | Refills: 0 | Status: SHIPPED | OUTPATIENT
Start: 2025-03-18 | End: 2025-03-17

## 2025-03-17 RX ORDER — SIMETHICONE 80 MG
80 TABLET,CHEWABLE ORAL EVERY 6 HOURS PRN
Qty: 30 TABLET | Refills: 0 | Status: SHIPPED | OUTPATIENT
Start: 2025-03-17

## 2025-03-17 RX ORDER — SIMETHICONE 80 MG
80 TABLET,CHEWABLE ORAL EVERY 6 HOURS PRN
Status: DISCONTINUED | OUTPATIENT
Start: 2025-03-17 | End: 2025-03-17 | Stop reason: HOSPADM

## 2025-03-17 RX ADMIN — SIMETHICONE 80 MG: 80 TABLET, CHEWABLE ORAL at 12:23

## 2025-03-17 RX ADMIN — LOSARTAN POTASSIUM 25 MG: 25 TABLET, FILM COATED ORAL at 07:54

## 2025-03-17 RX ADMIN — METRONIDAZOLE 500 MG: 500 INJECTION, SOLUTION INTRAVENOUS at 12:16

## 2025-03-17 RX ADMIN — METRONIDAZOLE 500 MG: 500 INJECTION, SOLUTION INTRAVENOUS at 05:09

## 2025-03-17 RX ADMIN — METHYLPREDNISOLONE SODIUM SUCCINATE 20 MG: 40 INJECTION, POWDER, FOR SOLUTION INTRAMUSCULAR; INTRAVENOUS at 05:09

## 2025-03-17 RX ADMIN — SODIUM CHLORIDE 125 ML/HR: 0.9 INJECTION, SOLUTION INTRAVENOUS at 07:55

## 2025-03-17 RX ADMIN — PANTOPRAZOLE SODIUM 40 MG: 40 TABLET, DELAYED RELEASE ORAL at 05:11

## 2025-03-17 RX ADMIN — ATORVASTATIN CALCIUM 10 MG: 10 TABLET, FILM COATED ORAL at 07:54

## 2025-03-17 NOTE — DISCHARGE SUMMARY
"Discharge Summary - Hospitalist   Name: Prince Trevino 49 y.o. male I MRN: [id number]  Unit/Bed#: -01 I Date of Admission: 3/16/2025   Date of Service: 3/17/2025 I Hospital Day: 0     Assessment & Plan  Colitis  Patient presented to the ED for evaluation of abdominal pain, nausea, and bloody stools.  He was previously he was seen earlier on 3/15/2025 for diarrhea and discharged home with colitis and was given me ABX metronidazole.  CT abdomen pelvis: \"Mild diffuse colitis, most prominent in the right colon. No evidence of diverticulosis or bowel obstruction.\"   Patient did not have any bloody stools during his first ER visit.  Patient states that once he was discharged home, he began having multiple episodes of bloody stools and severe abdominal pain.  Previous hemoglobin with first ER visit was 16.6 and repeat hemoglobin during current ER visit was 16.4.  Did not meet sepsis criteria  No fevers, UA negative, lactic acid normal, procalcitonin normal  WBC elevated from 14.84 -> 15.68, likely inflammatory  Received Dilaudid 1 mg IV in the ED for pain  Patient was treated with IV metronidazole while hospitalized, transition to p.o. Flagyl on discharge x 3 days for total of 5 days of therapy.  Patient was treated with IV fluids while hospitalized  Patient was started on clear liquid diet, and diet was advanced, patient tolerated diet without any acute abdominal pain.  C-reactive protein elevated at 18.8  C. difficile,  stool enteric bacterial PCR negative  stool occult positive  Patient did have bloody bowel movement on admission, however subsequent bowel movements were without any blood.  Patient was seen by GI, recommend outpatient GI follow-up in 4-6 weeks, if symptoms are ongoing, can consider repeat colonoscopy.  Peñaloza esophagus  Noted Hx of Peñaloza's esophagus  Continue home omeprazole  Rectal bleeding  Noted Hx of rectal bleeding  CAD (coronary artery disease)  Noted Hx of nonocclusive CAD  Family history " of CAD and HLD  Patient denies any chest pain currently  Esophageal reflux  Continue home omeprazole  Hypercholesterolemia  Continue home atorvastatin  Impaired fasting glucose  Lab Results   Component Value Date    HGBA1C 5.7 (H) 03/16/2025    HGBA1C 5.8 (H) 09/05/2024    HGBA1C 5.5 09/01/2023    Patient's hemoglobin A1c noted to be 5.8  Patient currently not on any antidiabetic medication  Recommend dietary and lifestyle changes  Hypertension  Continue home losartan     Medical Problems       Resolved Problems  Date Reviewed: 10/28/2022   None         MESSAGE TO PCP (Adrián Moctezuma MD) FOR FOLLOW UP:   Thank you for allowing us to participate in the care of your patient, Prince Trevino, who was hospitalized from 3/16/2025 through 03/17/25 with the admitting diagnosis of colitis, patient was treated with IV antibiotics metronidazole.  GI was consulted, recommend advancing diet to low residue, i patient tolerated advancing diet well, discharged home on p.o. Flagyl x 3 days for total of 5 days of therapy.  Medication Changes:  As above  Outpatient testing recommended:  Follow-up with GI  If you have any additional questions or would like to discuss further, please feel free to contact me.  Chelsea Mcknight MD  [facility] Internal Medicine, Hospitalist, [phone]     Admission Date:   Admission Orders (From admission, onward)       Ordered        03/16/25 0232  Place in Observation  Once                          Discharge Date: 03/17/25    Consultations During Hospital Stay:  GI    Procedures Performed:   None    Significant Findings / Test Results:   No orders to display   CT A/P:   Impression:       Mild diffuse colitis, most prominent in the right colon.         Incidental Findings:   As above       Test Results Pending at Discharge (will require follow up):   None     Complications:  None    Reason for Admission: Abdominal pain, nausea, blood in the stool    Hospital Course:   Prince Trevino is a 49 y.o. male  patient who originally presented to the hospital on 3/16/2025 due to abdominal pain, nausea, and bloody stools. Patient reports waking up on 3/15/2025 at 5:30 am with bad cramps, liquidy diarrhea, and small bloody stools. Patient presented to the ED for evaluation of abdominal pain and bloody stools.  He was previously he was seen earlier on 3/15/2025 for diarrhea and discharged home with colitis and was given me ABX metronidazole. CT abdomen pelvis showed mild diffuse colitis, most prominent in the right colon. No evidence of diverticulosis or bowel obstruction. Patient did not have any bloody stools during his first ER visit. Patient states that once he was discharged home, he began having multiple episodes of bloody stools and severe abdominal pain. Previous hemoglobin with first ER visit was 16.6 and repeat hemoglobin during current ER visit was 16.4.  Patient reports that after being discharged home he had bloody BMs every 15-20 minutes, and with each bowel movement there was more blood in his stools.  Patient did have an episode of blood in the stool on admission, he was treated with IV metronidazole, and GI was consulted.  Subsequent bowel movements were without any blood, patient's diet was slowly advanced and he tolerated well, GI recommends patient should follow-up with GI in the outpatient setting in 4-6 weeks.  Patient was medically stable for discharge home.      The patient, initially admitted to the hospital as inpatient, was discharged earlier than expected given the following: No longer having any blood in the stools, tolerating advancing diet.  Please see above list of diagnoses and related plan for additional information.     Condition at Discharge: stable    Discharge Day Visit / Exam:   Subjective: Patient seen and examined at bedside.  No acute events overnight.  Patient tolerated advancing diet.  No acute complaints at this time.  Vitals: Blood Pressure: 106/69 (03/17/25 1456)  Pulse: 104  "(03/17/25 1456)  Temperature: (!) 97.3 °F (36.3 °C) (03/17/25 0738)  Temp Source: Oral (03/16/25 1210)  Respirations: 19 (03/17/25 0738)  Height: 5' 6\" (167.6 cm) (03/16/25 0331)  Weight - Scale: 79.7 kg (175 lb 9.6 oz) (03/16/25 0331)  SpO2: 96 % (03/17/25 1456)  Physical Exam  Vitals and nursing note reviewed.   Constitutional:       General: He is not in acute distress.     Appearance: He is not toxic-appearing.   HENT:      Head: Normocephalic and atraumatic.   Eyes:      Extraocular Movements: Extraocular movements intact.      Pupils: Pupils are equal, round, and reactive to light.   Cardiovascular:      Rate and Rhythm: Normal rate and regular rhythm.   Pulmonary:      Effort: Pulmonary effort is normal.      Breath sounds: Normal breath sounds.   Abdominal:      General: There is no distension.      Palpations: Abdomen is soft.      Tenderness: There is abdominal tenderness (Mild tenderness with deep palpation of the LUQ).   Musculoskeletal:         General: No swelling.      Right lower leg: No edema.      Left lower leg: No edema.   Skin:     General: Skin is warm.   Neurological:      General: No focal deficit present.      Mental Status: He is alert and oriented to person, place, and time. Mental status is at baseline.   Psychiatric:         Mood and Affect: Mood normal.         Behavior: Behavior normal.          Discussion with Family: Patient declined call to .     Discharge instructions/Information to patient and family:   See after visit summary for information provided to patient and family.      Provisions for Follow-Up Care:  See after visit summary for information related to follow-up care and any pertinent home health orders.      Mobility at time of Discharge:   Basic Mobility Inpatient Raw Score: 24  JH-HLM Goal: 8: Walk 250 feet or more  JH-HLM Achieved: 8: Walk 250 feet ot more  HLM Goal achieved. Continue to encourage appropriate mobility.     Disposition:   Home    Planned " Readmission: No    Discharge Medications:  See after visit summary for reconciled discharge medications provided to patient and/or family.      Administrative Statements     **Please Note: This note may have been constructed using a voice recognition system**

## 2025-03-17 NOTE — ASSESSMENT & PLAN NOTE
"Patient presented to the ED for evaluation of abdominal pain, nausea, and bloody stools.  He was previously he was seen earlier on 3/15/2025 for diarrhea and discharged home with colitis and was given me ABX metronidazole.  CT abdomen pelvis: \"Mild diffuse colitis, most prominent in the right colon. No evidence of diverticulosis or bowel obstruction.\"   Patient did not have any bloody stools during his first ER visit.  Patient states that once he was discharged home, he began having multiple episodes of bloody stools and severe abdominal pain.  Previous hemoglobin with first ER visit was 16.6 and repeat hemoglobin during current ER visit was 16.4.  Did not meet sepsis criteria  No fevers, UA negative, lactic acid normal, procalcitonin normal  WBC elevated from 14.84 -> 15.68, likely inflammatory  Received Dilaudid 1 mg IV in the ED for pain  Patient was treated with IV metronidazole while hospitalized, transition to p.o. Flagyl on discharge x 3 days for total of 5 days of therapy.  Patient was treated with IV fluids while hospitalized  Patient was started on clear liquid diet, and diet was advanced, patient tolerated diet without any acute abdominal pain.  C-reactive protein elevated at 18.8  C. difficile,  stool enteric bacterial PCR negative  stool occult positive  Patient did have bloody bowel movement on admission, however subsequent bowel movements were without any blood.  Patient was seen by GI, recommend outpatient GI follow-up in 4-6 weeks, if symptoms are ongoing, can consider repeat colonoscopy.  "

## 2025-03-17 NOTE — UTILIZATION REVIEW
Initial Clinical Review    Admission: Date/Time/Statement:   Admission Orders (From admission, onward)       Ordered        03/16/25 0232  Place in Observation  Once                          Orders Placed This Encounter   Procedures    Place in Observation     Standing Status:   Standing     Number of Occurrences:   1     Level of Care:   Med Surg [16]     ED Arrival Information       Expected   -    Arrival   3/16/2025 00:06    Acuity   Urgent              Means of arrival   Walk-In    Escorted by   Self    Service   Hospitalist    Admission type   Emergency              Arrival complaint   seen here today, blood in stool             Chief Complaint   Patient presents with    Nausea     Nausea and diarrhea all day today. Pt seen yesterday for the same & dx with colitis. Pt unable to fill scripts. Pt unable to eat.        Initial Presentation: 49 y.o. male to ED via walk-in from home  Present to ED with worsening abdominal pain, nausea, and multiple bloody stools.    He was previously he was seen earlier on 3/15/2025 for diarrhea and discharged home with colitis and was given me ABX metronidazole. Patient reports that after being discharged home he had bloody BMs every 15-20 minutes, and with each bowel movement there was more blood in his stools.  Patient reports diffuse abdominal pain 5-6/10 and nausea. Patient reports he was recently treated for sinus infection with amoxicillin and noticed that he started having diarrhea afterwards. He reports history of diarrhea with ABX Augmentin.   PMHX Peñaloza's esophagus, chronic sinusitis, dysphagia, GERD, hyperlipidemia   Admitted to OBS with DX: Colitis   on exam: generalized abdominal tenderness. Bowel sounds are absent.  tachy; hypertensive; pain 8/10; WBC 15.68; Stool occult positive.   CT abdomen pelvis showed mild diffuse colitis, most prominent in the right colon. No evidence of diverticulosis or bowel obstruction.   PLAN: cont iv abx; start iv abx; start iv  solumedrol; monitor labs; NPO; GI consult; f/u C.diff, stool enteric panel       Anticipated Length of Stay/Certification Statement: Patient will be admitted on an observation basis with an anticipated length of stay of less than 2 midnights secondary to colitis management.         ED Treatment-Medication Administration from 03/16/2025 0005 to 03/16/2025 0330         Date/Time Order Dose Route Action     03/16/2025 0139 ondansetron (ZOFRAN) injection 4 mg 4 mg Intravenous Given     03/16/2025 0138 sodium chloride 0.9 % bolus 1,000 mL 1,000 mL Intravenous New Bag     03/16/2025 0141 HYDROmorphone (DILAUDID) injection 1 mg 1 mg Intravenous Given            Scheduled Medications:  atorvastatin, 10 mg, Oral, Daily  [Held by provider] enoxaparin, 40 mg, Subcutaneous, Daily  losartan, 25 mg, Oral, Daily  methylPREDNISolone sodium succinate, 20 mg, Intravenous, Q8H  metroNIDAZOLE, 500 mg, Intravenous, Q8H  pantoprazole, 40 mg, Oral, Early Morning      Continuous IV Infusions:  sodium chloride, 125 mL/hr, Intravenous, Continuous      PRN Meds:  acetaminophen, 650 mg, Oral, Q6H PRN  (3/16 recd x2)   HYDROmorphone, 0.2 mg, Intravenous, Q2H PRN  ondansetron, 4 mg, Intravenous, Q6H PRN  oxyCODONE, 2.5 mg, Oral, Q4H PRN   Or  oxyCODONE, 5 mg, Oral, Q4H PRN      ED Triage Vitals   Temperature Pulse Respirations Blood Pressure SpO2 Pain Score   03/16/25 0009 03/16/25 0009 03/16/25 0009 03/16/25 0009 03/16/25 0011 03/16/25 0141   (!) 97.1 °F (36.2 °C) (!) 118 17 149/90 98 % 8     Weight (last 2 days)       Date/Time Weight    03/16/25 0331 79.7 (175.6)    03/16/25 0009 78.5 (173)            Vital Signs (last 3 days)       Date/Time Temp Pulse Resp BP MAP (mmHg) SpO2 O2 Device Patient Position - Orthostatic VS Pain    03/17/25 07:38:07 97.3 °F (36.3 °C) 83 19 112/73 86 94 % -- -- --    03/16/25 22:18:45 97.3 °F (36.3 °C) 90 18 105/74 84 95 % -- -- --    03/16/25 2131 -- -- -- -- -- -- -- -- 4    03/16/25 20:01:51 -- 92 -- 119/75 90  97 % -- -- --    03/16/25 1958 -- -- -- -- -- -- None (Room air) -- No Pain    03/16/25 1955 -- 95 -- -- -- 96 % -- -- --    03/16/25 15:39:31 97.3 °F (36.3 °C) 105 17 103/67 79 93 % -- -- --    03/16/25 1514 99.6 °F (37.6 °C) -- -- -- -- -- -- -- Med Not Given for Pain - for MAR use only    03/16/25 1210 99.2 °F (37.3 °C) 111 20 118/75 89 95 % None (Room air) Lying No Pain    03/16/25 08:16:14 97 °F (36.1 °C) 97 18 121/82 95 98 % -- -- --    03/16/25 0800 -- -- -- -- -- -- None (Room air) -- 4    03/16/25 0342 -- -- -- -- -- -- -- -- 7    03/16/25 03:34:58 96.6 °F (35.9 °C) -- -- 116/81 93 -- -- -- --    03/16/25 0332 -- -- -- -- -- -- -- -- 7    03/16/25 0141 -- -- -- -- -- -- -- -- 8    03/16/25 0011 -- 115 -- -- -- 98 % -- -- --    03/16/25 0009 97.1 °F (36.2 °C) 118 17 149/90 -- -- -- Lying --              Pertinent Labs/Diagnostic Test Results:     Results from last 7 days   Lab Units 03/17/25  0515 03/16/25  0626 03/16/25  0128 03/15/25  1403   WBC Thousand/uL 15.91* 14.79* 15.68* 14.84*   HEMOGLOBIN g/dL 13.7 14.5 16.4 16.6   HEMATOCRIT % 40.8 43.3 49.6* 49.1   PLATELETS Thousands/uL 223 213 259 259   TOTAL NEUT ABS Thousands/µL  --   --  12.69* 12.13*   BANDS PCT %  --  1  --   --         Results from last 7 days   Lab Units 03/17/25  0515 03/16/25  0523 03/16/25  0128 03/15/25  1403   SODIUM mmol/L 143 139 139 139   POTASSIUM mmol/L 3.7 4.3 4.3 3.9   CHLORIDE mmol/L 111* 110* 105 106   CO2 mmol/L 26 22 27 25   ANION GAP mmol/L 6 7 7 8   BUN mg/dL 10 17 17 19   CREATININE mg/dL 0.69 0.88 1.06 0.96   EGFR ml/min/1.73sq m 111 100 82 92   CALCIUM mg/dL 8.4 7.9* 9.0 9.5   MAGNESIUM mg/dL 1.9  --  1.9  --    PHOSPHORUS mg/dL  --   --  3.7  --      Results from last 7 days   Lab Units 03/16/25  0523 03/16/25  0128 03/15/25  1403   AST U/L 20 18 25   ALT U/L 25 32 41   ALK PHOS U/L 79 96 109*   TOTAL PROTEIN g/dL 5.7* 6.8 7.2   ALBUMIN g/dL 3.7 4.4 4.6   TOTAL BILIRUBIN mg/dL 0.89 1.05* 0.84        Results from  last 7 days   Lab Units 03/17/25  0515 03/16/25  0523 03/16/25  0128 03/15/25  1403   GLUCOSE RANDOM mg/dL 134 125 114 112        Results from last 7 days   Lab Units 03/16/25  0128   HEMOGLOBIN A1C % 5.7*   EAG mg/dl 117        Results from last 7 days   Lab Units 03/16/25  0532 03/15/25  1403   PROTIME seconds 13.9 12.9   INR  1.03 0.93   PTT seconds 29 30     Results from last 7 days   Lab Units 03/16/25  0128   TSH 3RD GENERATON uIU/mL 1.014     Results from last 7 days   Lab Units 03/16/25  0128   PROCALCITONIN ng/ml 0.06     Results from last 7 days   Lab Units 03/15/25  1403   LACTIC ACID mmol/L 1.2        Results from last 7 days   Lab Units 03/16/25  0626   FERRITIN ng/mL 57   IRON SATURATION % 15   IRON ug/dL 44*   TIBC ug/dL 302.4     Results from last 7 days   Lab Units 03/16/25  0626   TRANSFERRIN mg/dL 216        Results from last 7 days   Lab Units 03/15/25  1403   LIPASE u/L 14     Results from last 7 days   Lab Units 03/16/25  0535 03/16/25  0523 03/16/25  0128   CRP mg/L 23.1* 20.1* 18.8*   SED RATE mm/hour  --   --  3        Results from last 7 days   Lab Units 03/15/25  1403   CLARITY UA  Clear   COLOR UA  Yellow   SPEC GRAV UA  >=1.030   PH UA  5.5   GLUCOSE UA mg/dl Negative   KETONES UA mg/dl Negative   BLOOD UA  Negative   PROTEIN UA mg/dl Negative   NITRITE UA  Negative   BILIRUBIN UA  Negative   UROBILINOGEN UA E.U./dl 0.2   LEUKOCYTES UA  Negative          Past Medical History:   Diagnosis Date    Peñaloza's esophagus     Chronic sinusitis     Chronic throat clearing     DNS (deviated nasal septum)     Dysphagia     ETD (eustachian tube dysfunction)     GERD (gastroesophageal reflux disease)     Globus sensation     Hyperlipemia     Neck pain on left side     PND (post-nasal drip)     Rhinitis     Sleep difficulties     snoring     Present on Admission:   Colitis   Impaired fasting glucose   Hypercholesterolemia   Esophageal reflux   CAD (coronary artery disease)   Peñaloza esophagus    Rectal bleeding   Hypertension      Admitting Diagnosis: Colitis [K52.9]  GI bleeding [K92.2]  Nausea [R11.0]  Age/Sex: 49 y.o. male    Network Utilization Review Department  ATTENTION: Please call with any questions or concerns to [phone] and carefully listen to the prompts so that you are directed to the right person. All voicemails are confidential.   For Discharge needs, contact Care Management DC Support Team at [phone] opt. [phone]  Send all requests for admission clinical reviews, approved or denied determinations and any other requests to dedicated fax number below belonging to the campus where the patient is receiving treatment. List of dedicated fax numbers for the Facilities:  FACILITY NAME UR FAX NUMBER   ADMISSION DENIALS (Administrative/Medical Necessity) [phone]   DISCHARGE SUPPORT TEAM (NETWORK) [phone]   PARENT CHILD HEALTH (Maternity/NICU/Pediatrics) [phone]   [facility] [phone]   General acute hospital [phone]   [facility] [phone]   [facility] [phone]   [facility] [phone]   [facility] [phone]   [facility] [phone]   [facility] [phone]   [facility] [phone]   Psychiatric hospital [phone]   [facility] [phone]   [facility] [phone]

## 2025-03-17 NOTE — ASSESSMENT & PLAN NOTE
Lab Results   Component Value Date    HGBA1C 5.7 (H) 03/16/2025    HGBA1C 5.8 (H) 09/05/2024    HGBA1C 5.5 09/01/2023    Patient's hemoglobin A1c noted to be 5.8  Patient currently not on any antidiabetic medication  Recommend dietary and lifestyle changes

## 2025-03-17 NOTE — ASSESSMENT & PLAN NOTE
Patient with acute onset abdominal pain, nausea, diarrhea and bloody stools with findings of mild diffuse colitis on CT  C diff and bacterial stool panel negative   On Flagyl and steroids   Symptoms improving    Suspect acute infectious colitis  D/c steroids   Continue Flagyl  Advance diet to low residue, if tolerating lunch okay for d/c home this afternoon from GI standpoint  Recommend f/u with established GI in 4-6 weeks, if ongoing symptoms, consideration of repeat colonoscopy

## 2025-03-17 NOTE — CONSULTS
Consultation - Gastroenterology   Name: Prince Trevino 49 y.o. male I MRN: [id number]  Unit/Bed#: -01 I Date of Admission: 3/16/2025   Date of Service: 3/17/2025 I Hospital Day: 0     Inpatient consult to gastroenterology  Consult performed by: Yvrose Pederson PA-C  Consult ordered by: RICHARD Stone      Physician Requesting Evaluation: Chelsea Mcknight MD   Reason for Evaluation / Principal Problem: colitis, GI bleeding, Peñaloza's esophagus with dysplasia    Assessment & Plan  Colitis  Patient with acute onset abdominal pain, nausea, diarrhea and bloody stools with findings of mild diffuse colitis on CT  C diff and bacterial stool panel negative   On Flagyl and steroids   Symptoms improving    Suspect acute infectious colitis  D/c steroids   Continue Flagyl  Advance diet to low residue, if tolerating lunch okay for d/c home this afternoon from GI standpoint  Recommend f/u with established GI in 4-6 weeks, if ongoing symptoms, consideration of repeat colonoscopy   Rectal bleeding  See above  Peñaloza esophagus  Continue PPI and f/u with established GI  Esophageal reflux  See above    HPI: Prince Trevino is a 49 y.o. year old male with a PMHx Peñaloza's esophagus, GERD, HLD, who presented with abdominal pain, nausea and hematochezia.     Patient seen at bedside this AM. He reports he is feeling improved. He started with diarrhea and abdominal pain on Friday 3/14. He thought it was food poisoning. The following day he noticed blood in his stool which prompted him to go to the ED. CT AP w/ IV contrast on 3/15/25 with mild diffuse colitis, most prominent in the right colon. He was dx with colitis and d/c home with a course of abx. His symptoms persistent and he presented back to the ED yesterday on 3/16. He did not  the abx yet. In the ED, labs with WBC 15.68, CRP 18.8. He was started on IV abx and steroids and admitted with colitis. GI has been consulted for further management.     Patient follows  with [facility]. Last seen 11/13/24.   EGD/colonoscopy 8/5/2022: erythematous mucosa in antrum s/p bx, irregular Z-line, 2 mm polyp in TC, repeat egd/colo in 5 yrs. 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 pylori      Past Medical History:   Diagnosis Date    Peñaloza's esophagus     Chronic sinusitis     Chronic throat clearing     DNS (deviated nasal septum)     Dysphagia     ETD (eustachian tube dysfunction)     GERD (gastroesophageal reflux disease)     Globus sensation     Hyperlipemia     Neck pain on left side     PND (post-nasal drip)     Rhinitis     Sleep difficulties     snoring     Past Surgical History:   Procedure Laterality Date    APPENDECTOMY      COLONOSCOPY      UPPER GASTROINTESTINAL ENDOSCOPY       Social History   Social History     Substance and Sexual Activity   Alcohol Use Yes    Alcohol/week: 2.0 standard drinks of alcohol    Types: 2 Glasses of wine per week     Social History     Substance and Sexual Activity   Drug Use Never     Social History     Tobacco Use   Smoking Status Former    Current packs/day: 1.50    Average packs/day: 1.5 packs/day for 3.2 years (4.8 ttl pk-yrs)    Types: Cigarettes    Start date: 2022   Smokeless Tobacco Never       Family History   Problem Relation Age of Onset    Hyperlipidemia Mother     Heart attack Father     Heart disease Father         Stent blockage    No Known Problems Sister     No Known Problems Brother     Hyperlipidemia Daughter     Eczema Daughter          Medications Prior to Admission:     atorvastatin (LIPITOR) 10 mg tablet    cefadroxil (DURICEF) 500 mg capsule    fluticasone (FLONASE) 50 mcg/act nasal spray    losartan (COZAAR) 25 mg tablet    metroNIDAZOLE (FLAGYL) 500 mg tablet    omeprazole (PriLOSEC) 20 mg delayed release capsule    ondansetron (ZOFRAN-ODT) 4 mg disintegrating tablet    Current Facility-Administered Medications:     acetaminophen (TYLENOL) tablet 650 mg, Q6H PRN    atorvastatin (LIPITOR) tablet 10  mg, Daily    [Held by provider] enoxaparin (LOVENOX) subcutaneous injection 40 mg, Daily    HYDROmorphone HCl (DILAUDID) injection 0.2 mg, Q2H PRN    losartan (COZAAR) tablet 25 mg, Daily    metroNIDAZOLE (FLAGYL) IVPB (premix) 500 mg 100 mL, Q8H, Last Rate: 500 mg (03/17/25 0509)    ondansetron (ZOFRAN) injection 4 mg, Q6H PRN    oxyCODONE (ROXICODONE) IR tablet 2.5 mg, Q4H PRN **OR** oxyCODONE (ROXICODONE) IR tablet 5 mg, Q4H PRN    pantoprazole (PROTONIX) EC tablet 40 mg, Early Morning    sodium chloride 0.9 % infusion, Continuous, Last Rate: 125 mL/hr (03/17/25 2095)  No Known Allergies    Physical Exam  Constitutional:       General: He is not in acute distress.     Appearance: He is not ill-appearing.   HENT:      Head: Normocephalic and atraumatic.      Mouth/Throat:      Mouth: Mucous membranes are moist.   Eyes:      General: No scleral icterus.  Pulmonary:      Effort: Pulmonary effort is normal. No respiratory distress.   Abdominal:      General: Abdomen is flat. There is no distension.      Palpations: Abdomen is soft.      Tenderness: There is no abdominal tenderness. There is no guarding.   Skin:     General: Skin is warm and dry.      Coloration: Skin is not jaundiced.   Neurological:      Mental Status: He is alert.       Most Recent Vital Signs:  Vitals:    03/16/25 2001 03/16/25 2218 03/17/25 0738 03/17/25 0830   BP: 119/75 105/74 112/73    BP Location:       Pulse: 92 90 83    Resp:  18 19    Temp:  (!) 97.3 °F (36.3 °C) (!) 97.3 °F (36.3 °C)    TempSrc:       SpO2: 97% 95% 94% 96%   Weight:       Height:           Intake/Output Summary (Last 24 hours) at 3/17/2025 1004  Last data filed at 3/17/2025 0738  Gross per 24 hour   Intake 1800 ml   Output 1453 ml   Net 347 ml       LABS/IMAGING  Lab Results: I have reviewed all relevant lab results during this hospitalization.    Imaging Studies:  I have reviewed all the relevant images during this hospitalizations    Counseling / Coordination of  Care  Total time spent today 45 minutes. Greater than 50% of total time was spent with the patient and / or family counseling and / or coordination of care.    Yvrose Pederson PA-C

## 2025-03-17 NOTE — PLAN OF CARE
Problem: PAIN - ADULT  Goal: Verbalizes/displays adequate comfort level or baseline comfort level  Description: Interventions:  - Encourage patient to monitor pain and request assistance  - Assess pain using appropriate pain scale  - Administer analgesics based on type and severity of pain and evaluate response  - Implement non-pharmacological measures as appropriate and evaluate response  - Consider cultural and social influences on pain and pain management  - Notify physician/advanced practitioner if interventions unsuccessful or patient reports new pain  Outcome: Progressing     Problem: INFECTION - ADULT  Goal: Absence or prevention of progression during hospitalization  Description: INTERVENTIONS:  - Assess and monitor for signs and symptoms of infection  - Monitor lab/diagnostic results  - Monitor all insertion sites, i.e. indwelling lines, tubes, and drains  - Monitor endotracheal if appropriate and nasal secretions for changes in amount and color  - [place] appropriate cooling/warming therapies per order  - Administer medications as ordered  - Instruct and encourage patient and family to use good hand hygiene technique  - Identify and instruct in appropriate isolation precautions for identified infection/condition  Outcome: Progressing

## 2025-03-21 LAB — TPMT RBC-CCNC: NORMAL UNITS/ML RBC

## 2025-03-25 ENCOUNTER — TELEPHONE (OUTPATIENT)
Age: 50
End: 2025-03-25

## 2025-03-25 NOTE — TELEPHONE ENCOUNTER
"Please advise     Pt transferred to nurses line. He was in the hospital on 3/15 and 3/16 due to colitis flare.   Currently doing better- soft pudding consistency stools 4-5x a day- no rectal bleeding. Abdominal pain is better, does have a little nausea but appetitie is the same.     When he was seen in the ED was \"recommend outpatient GI follow-up in 4-6 weeks, if symptoms are ongoing, can consider repeat colonoscopy. \"    Pt has appt with GI on 5/28 but reports he lives far from the office and is asking if he can just be scheduled for a colonoscopy without coming into office.     He prefers to stay at  and go to the [facility] location. He was a Dr. Cuellar pt .   "

## 2025-03-25 NOTE — TELEPHONE ENCOUNTER
Patient really should be seen in office to re-evaluate as his symptoms were acute in nature and if resolved, he may not need colonoscopy. If he chooses to travel to [facility] that is fine, but he can also consider going to a closer location to his home since dr leon retired anyway

## 2025-03-25 NOTE — TELEPHONE ENCOUNTER
Called and spoke with patient.Informed him of recommendations.He verbalized understanding.He is scheduled for apt in May.He also wants to be added to the wait list for a sooner apt.

## 2025-03-25 NOTE — TELEPHONE ENCOUNTER
Pt requesting to speak with Nurse in regards to his symptoms and recent ED visits. Transferred call to [facility].

## 2025-03-27 NOTE — TELEPHONE ENCOUNTER
"  Pt calling in, he had questions regarding his colitis dx from the ED and when he should expect symptoms to resolve or inflammation to get better.   He reports he has noticed a weight loss from 173 to 165 as of last night but appetite has not changed and he is back to a regular diet.   He does occasional get nausea and left abdominal discomfort \"pulling sensation\" but it is mild.     BM are pudding consistency going 3-5x a day but often with feeling of incomplete evacuation and stomach gurgling with urgency. No rectal bleeding    Reassurance provided and he has appt on 5/28 and on wait list.     "

## 2025-03-27 NOTE — TELEPHONE ENCOUNTER
Agree with information you provided to patient.  He was added to the wait list to be seen sooner if an appointment becomes available.He will be further evaluated at time of office visit.